# Patient Record
Sex: FEMALE | Race: WHITE | NOT HISPANIC OR LATINO | URBAN - METROPOLITAN AREA
[De-identification: names, ages, dates, MRNs, and addresses within clinical notes are randomized per-mention and may not be internally consistent; named-entity substitution may affect disease eponyms.]

---

## 2019-06-18 ENCOUNTER — OFFICE VISIT (OUTPATIENT)
Dept: GYNECOLOGY | Facility: CLINIC | Age: 29
End: 2019-06-18
Payer: COMMERCIAL

## 2019-06-18 VITALS
SYSTOLIC BLOOD PRESSURE: 110 MMHG | WEIGHT: 238 LBS | BODY MASS INDEX: 35.25 KG/M2 | DIASTOLIC BLOOD PRESSURE: 80 MMHG | HEIGHT: 69 IN

## 2019-06-18 DIAGNOSIS — Z01.419 ENCOUNTER FOR ROUTINE GYNECOLOGICAL EXAMINATION WITH PAPANICOLAOU SMEAR OF CERVIX: ICD-10-CM

## 2019-06-18 DIAGNOSIS — Z30.09 GENERAL COUNSELING AND ADVICE ON CONTRACEPTIVE MANAGEMENT: Primary | ICD-10-CM

## 2019-06-18 PROCEDURE — S0610 ANNUAL GYNECOLOGICAL EXAMINA: HCPCS | Performed by: OBSTETRICS & GYNECOLOGY

## 2019-06-18 PROCEDURE — G0145 SCR C/V CYTO,THINLAYER,RESCR: HCPCS | Performed by: OBSTETRICS & GYNECOLOGY

## 2019-06-18 RX ORDER — ETONOGESTREL AND ETHINYL ESTRADIOL VAGINAL RING .015; .12 MG/D; MG/D
RING VAGINAL
Qty: 1 EACH | Refills: 12 | Status: SHIPPED | OUTPATIENT
Start: 2019-06-18 | End: 2021-09-26 | Stop reason: HOSPADM

## 2019-06-18 NOTE — PROGRESS NOTES
Chief Complaint:  Annual    HPI:  6/18/2019      Marcela Cho is a 28 y.o. female who presents for annual exam. The patient has no complaints today. The patient is sexually active. GYN screening history: last pap: was normal. The patient is not taking hormone replacement therapy.  The patient participates in regular exercise: yes.  The patient reports that there is not domestic violence in her life.    History of abnormal Pap smear: no  Family history of uterine or ovarian cancer: no  History of abnormal mammogram: no  Family history of breast cancer: no    OB History: Menstrual History:  OB History     No data available         Patient's last menstrual period was 06/01/2019 (exact date).         Medical History: History reviewed. No pertinent past medical history.    Surgical History:   Past Surgical History:   Procedure Laterality Date   • CHOLECYSTECTOMY     • TONSILLECTOMY         Social History:   Social History     Social History   • Marital status: Single     Spouse name: N/A   • Number of children: N/A   • Years of education: N/A     Social History Main Topics   • Smoking status: Former Smoker   • Smokeless tobacco: Never Used   • Alcohol use Yes   • Drug use: Yes     Types: Marijuana   • Sexual activity: Yes     Partners: Male     Other Topics Concern   • None     Social History Narrative   • None       Family History:   Family History   Problem Relation Age of Onset   • Family history unknown: Yes       Current Medications:   Current Outpatient Prescriptions   Medication Sig Dispense Refill   • etonogestrel-ethinyl estradiol (NUVARING) 0.12-0.015 mg/24 hr vaginal ring Insert vaginally and leave in for 3 consecutive weeks, then remove for 1 week. 1 each 12     No current facility-administered medications for this visit.        Allergies: Patient has no known allergies.    Review of Systems  Constitutional: negative  Gastrointestinal: negative  Genitourinary:negative  Reproductive:normal menses, no abnormal  "bleeding, pelvic pain or discharge, no breast pain or new or enlarging lumps on self exam  Integument/breast: negative  Musculoskeletal:negative  Neurological: negative  Behavioral/Psych: negative  Endocrine: negative    Physical Exam  /80 (BP Location: Right upper arm, Patient Position: Sitting)   Ht 1.753 m (5' 9\")   Wt 108 kg (238 lb)   LMP 06/01/2019 (Exact Date)   BMI 35.15 kg/m²      General Appearance: Alert, cooperative, no acute distress  Head: Normocephalic, without obvious abnormality, atraumatic    Neck: no adenopathy  Thyroid: no enlargement/tenderness/nodules  ,   Breast: no masses, nontender and no discharge  Abdomen: Soft, nontender, nondistended,   no masses, no organomegaly  Back: kyphosisNo   Pelvic:normal, Bartholin's, Urethra, Martinez's normal, female escutcheon  normal mucosa, normal discharge  anteverted  normal size, anteverted  normal adnexa and no mass, fullness, tenderness  Rectal:   Extremities:  Musculoskeletal:  Skin: Skin color, texture, turgor normal, no rashes or lesions  Lymph nodes: inguinal and axillary nodes normal  Neurologic:oriented and  memory intact  Psych:normal affect and behavior appropriate    Assessment  Presents today for her routine annual exam and also to discuss contraceptive methods.  The NuvaRing.  She is also interested in a so that she could time her cycle for her up and coming honeymoon in August.  She previously has used the NuvaRing with no problem    Plan    The results of Pap NuvaRing ordered after discussion with the patient.    Return in about 1 year (around 6/18/2020).  Kin Shafer MD        "

## 2019-06-21 LAB
CASE RPRT: NORMAL
CLINICAL INFO: NORMAL
CLINICAL INFO: NORMAL
LMP START DATE: NORMAL
SPECIMEN PROCESSING COMMENT: NORMAL
THIN PREP CVX: NORMAL

## 2021-02-05 LAB
HBV SURFACE AG SER QL: NONREACTIVE
HIV 1+2 AB+HIV1 P24 AG SERPL QL IA: NONREACTIVE
QST CHLAMYDIA TRACHOMATIS RNA, TMA: NEGATIVE
QST NEISSERIA GONORRHOEAE RNA, TMA: NEGATIVE
RUBELLA IGG SCREEN: NORMAL
T PALLIDUM AB SER QL IF: NONREACTIVE

## 2021-03-08 ENCOUNTER — TRANSCRIBE ORDERS (OUTPATIENT)
Dept: SCHEDULING | Age: 31
End: 2021-03-08

## 2021-03-08 DIAGNOSIS — Z36.82 ENCOUNTER FOR ANTENATAL SCREENING FOR NUCHAL TRANSLUCENCY: Primary | ICD-10-CM

## 2021-03-17 ENCOUNTER — HOSPITAL ENCOUNTER (OUTPATIENT)
Dept: PERINATAL CARE | Facility: HOSPITAL | Age: 31
Discharge: HOME | End: 2021-03-17
Attending: OBSTETRICS & GYNECOLOGY
Payer: COMMERCIAL

## 2021-03-17 VITALS — BODY MASS INDEX: 34.8 KG/M2 | WEIGHT: 235 LBS | HEIGHT: 69 IN

## 2021-03-17 DIAGNOSIS — Z36.3 ANTENATAL SCREENING FOR MALFORMATION USING ULTRASONICS: ICD-10-CM

## 2021-03-17 DIAGNOSIS — Z3A.13 13 WEEKS GESTATION OF PREGNANCY: ICD-10-CM

## 2021-03-17 DIAGNOSIS — Z36.82 ENCOUNTER FOR ANTENATAL SCREENING FOR NUCHAL TRANSLUCENCY: ICD-10-CM

## 2021-03-17 PROCEDURE — 76801 OB US < 14 WKS SINGLE FETUS: CPT

## 2021-04-05 ENCOUNTER — TRANSCRIBE ORDERS (OUTPATIENT)
Dept: SCHEDULING | Age: 31
End: 2021-04-05

## 2021-04-05 DIAGNOSIS — Z36.3 ENCOUNTER FOR ANTENATAL SCREENING FOR MALFORMATIONS: Primary | ICD-10-CM

## 2021-05-04 ENCOUNTER — HOSPITAL ENCOUNTER (OUTPATIENT)
Dept: PERINATAL CARE | Facility: HOSPITAL | Age: 31
Discharge: HOME | End: 2021-05-04
Attending: OBSTETRICS & GYNECOLOGY
Payer: COMMERCIAL

## 2021-05-04 DIAGNOSIS — O35.9XX0 SUSPECTED FETAL ANOMALY, ANTEPARTUM, SINGLE OR UNSPECIFIED FETUS: ICD-10-CM

## 2021-05-04 DIAGNOSIS — Z36.3 ENCOUNTER FOR ANTENATAL SCREENING FOR MALFORMATION: ICD-10-CM

## 2021-05-04 DIAGNOSIS — O99.212 OBESITY AFFECTING PREGNANCY IN SECOND TRIMESTER: ICD-10-CM

## 2021-05-04 DIAGNOSIS — Z36.3 ENCOUNTER FOR ANTENATAL SCREENING FOR MALFORMATIONS: ICD-10-CM

## 2021-05-04 DIAGNOSIS — Z3A.20 20 WEEKS GESTATION OF PREGNANCY: ICD-10-CM

## 2021-05-04 DIAGNOSIS — Z36.86 ENCOUNTER FOR ANTENATAL SCREENING FOR CERVICAL LENGTH: ICD-10-CM

## 2021-05-04 PROCEDURE — 76811 OB US DETAILED SNGL FETUS: CPT

## 2021-05-08 ENCOUNTER — IMMUNIZATION (OUTPATIENT)
Dept: IMMUNIZATION | Facility: CLINIC | Age: 31
End: 2021-05-08

## 2021-06-03 ENCOUNTER — IMMUNIZATION (OUTPATIENT)
Dept: IMMUNIZATION | Facility: CLINIC | Age: 31
End: 2021-06-03

## 2021-08-27 LAB — GP B STREP SPEC QL CULT: NO GROWTH

## 2021-09-17 ENCOUNTER — APPOINTMENT (RX ONLY)
Dept: URBAN - METROPOLITAN AREA CLINIC 23 | Facility: CLINIC | Age: 31
Setting detail: DERMATOLOGY
End: 2021-09-17

## 2021-09-17 DIAGNOSIS — D22 MELANOCYTIC NEVI: ICD-10-CM

## 2021-09-17 DIAGNOSIS — L82.1 OTHER SEBORRHEIC KERATOSIS: ICD-10-CM

## 2021-09-17 DIAGNOSIS — Z87.2 PERSONAL HISTORY OF DISEASES OF THE SKIN AND SUBCUTANEOUS TISSUE: ICD-10-CM

## 2021-09-17 DIAGNOSIS — L81.4 OTHER MELANIN HYPERPIGMENTATION: ICD-10-CM

## 2021-09-17 PROBLEM — D22.5 MELANOCYTIC NEVI OF TRUNK: Status: ACTIVE | Noted: 2021-09-17

## 2021-09-17 PROBLEM — D48.5 NEOPLASM OF UNCERTAIN BEHAVIOR OF SKIN: Status: ACTIVE | Noted: 2021-09-17

## 2021-09-17 PROCEDURE — ? ADDITIONAL NOTES

## 2021-09-17 PROCEDURE — ? COUNSELING

## 2021-09-17 PROCEDURE — 99203 OFFICE O/P NEW LOW 30 MIN: CPT | Mod: 25

## 2021-09-17 PROCEDURE — 11102 TANGNTL BX SKIN SINGLE LES: CPT

## 2021-09-17 PROCEDURE — ? BIOPSY BY SHAVE METHOD

## 2021-09-17 ASSESSMENT — LOCATION DETAILED DESCRIPTION DERM
LOCATION DETAILED: LEFT INFERIOR MEDIAL UPPER BACK
LOCATION DETAILED: RIGHT MEDIAL SUPERIOR CHEST
LOCATION DETAILED: LEFT SUPERIOR MEDIAL UPPER BACK

## 2021-09-17 ASSESSMENT — LOCATION ZONE DERM: LOCATION ZONE: TRUNK

## 2021-09-17 ASSESSMENT — LOCATION SIMPLE DESCRIPTION DERM
LOCATION SIMPLE: LEFT UPPER BACK
LOCATION SIMPLE: CHEST

## 2021-09-23 ENCOUNTER — HOSPITAL ENCOUNTER (INPATIENT)
Facility: HOSPITAL | Age: 31
LOS: 2 days | Discharge: HOME | End: 2021-09-26
Attending: OBSTETRICS & GYNECOLOGY | Admitting: OBSTETRICS & GYNECOLOGY
Payer: COMMERCIAL

## 2021-09-23 RX ORDER — CETIRIZINE HYDROCHLORIDE 10 MG/1
10 TABLET ORAL DAILY
COMMUNITY

## 2021-09-23 RX ORDER — ASPIRIN 81 MG/1
81 TABLET ORAL DAILY
COMMUNITY
End: 2021-09-26 | Stop reason: HOSPADM

## 2021-09-23 ASSESSMENT — PATIENT HEALTH QUESTIONNAIRE - PHQ9: SUM OF ALL RESPONSES TO PHQ9 QUESTIONS 1 & 2: 0

## 2021-09-24 ENCOUNTER — ANESTHESIA (INPATIENT)
Dept: OBSTETRICS AND GYNECOLOGY | Facility: HOSPITAL | Age: 31
End: 2021-09-24
Payer: COMMERCIAL

## 2021-09-24 ENCOUNTER — ANESTHESIA EVENT (INPATIENT)
Dept: OBSTETRICS AND GYNECOLOGY | Facility: HOSPITAL | Age: 31
End: 2021-09-24
Payer: COMMERCIAL

## 2021-09-24 PROBLEM — Z34.90 TERM PREGNANCY: Status: ACTIVE | Noted: 2021-09-24

## 2021-09-24 PROBLEM — O13.3 GESTATIONAL HYPERTENSION, THIRD TRIMESTER: Status: ACTIVE | Noted: 2021-09-24

## 2021-09-24 LAB
ABO + RH BLD: NORMAL
ABO + RH BLD: NORMAL
ALBUMIN SERPL-MCNC: 2.7 G/DL (ref 3.4–5)
ALP SERPL-CCNC: 185 IU/L (ref 35–126)
ALT SERPL-CCNC: 14 IU/L (ref 11–54)
AMPHET UR QL SCN: NOT DETECTED
ANION GAP SERPL CALC-SCNC: 12 MEQ/L (ref 3–15)
AST SERPL-CCNC: 19 IU/L (ref 15–41)
BARBITURATES UR QL SCN: NOT DETECTED
BENZODIAZ UR QL SCN: NOT DETECTED
BILIRUB SERPL-MCNC: 0.1 MG/DL (ref 0.3–1.2)
BLD GP AB SCN SERPL QL: NEGATIVE
BUN SERPL-MCNC: 9 MG/DL (ref 8–20)
BUPRENORPHINE UR QL: NOT DETECTED
CALCIUM SERPL-MCNC: 9.7 MG/DL (ref 8.9–10.3)
CANNABINOIDS UR QL SCN: NOT DETECTED
CHLORIDE SERPL-SCNC: 100 MEQ/L (ref 98–109)
CO2 SERPL-SCNC: 24 MEQ/L (ref 22–32)
COCAINE UR QL SCN: NOT DETECTED
CREAT SERPL-MCNC: 0.6 MG/DL (ref 0.6–1.1)
CREAT UR-MCNC: 56.9 MG/DL
D AG BLD QL: POSITIVE
D AG BLD QL: POSITIVE
ERYTHROCYTE [DISTWIDTH] IN BLOOD BY AUTOMATED COUNT: 13 % (ref 11.7–14.4)
GFR SERPL CREATININE-BSD FRML MDRD: >60 ML/MIN/1.73M*2
GLUCOSE SERPL-MCNC: 84 MG/DL (ref 70–99)
HCT VFR BLDCO AUTO: 38.1 % (ref 35–45)
HGB BLD-MCNC: 12.7 G/DL (ref 11.8–15.7)
LABORATORY COMMENT REPORT: NORMAL
LABORATORY COMMENT REPORT: NORMAL
MCH RBC QN AUTO: 28.7 PG (ref 28–33.2)
MCHC RBC AUTO-ENTMCNC: 33.3 G/DL (ref 32.2–35.5)
MCV RBC AUTO: 86 FL (ref 83–98)
METHADONE UR QL SCN: NOT DETECTED
OPIATES UR QL SCN: NOT DETECTED
OXYCODONE UR QL SCN: NOT DETECTED
PCP UR QL SCN: NOT DETECTED
PDW BLD AUTO: 10 FL (ref 9.4–12.3)
PLATELET # BLD AUTO: 324 K/UL (ref 150–369)
POTASSIUM SERPL-SCNC: 3.9 MEQ/L (ref 3.6–5.1)
PROT SERPL-MCNC: 6.9 G/DL (ref 6–8.2)
PROT UR-MCNC: 7 MG/DL
PROT/CREAT UR: 0.12 MG/G CREAT
RBC # BLD AUTO: 4.43 M/UL (ref 3.93–5.22)
SARS-COV-2 RNA RESP QL NAA+PROBE: NEGATIVE
SODIUM SERPL-SCNC: 136 MEQ/L (ref 136–144)
SPECIMEN EXP DATE BLD: NORMAL
T PALLIDUM AB SER QL IF: NONREACTIVE
WBC # BLD AUTO: 12.45 K/UL (ref 3.8–10.5)

## 2021-09-24 PROCEDURE — 85027 COMPLETE CBC AUTOMATED: CPT | Performed by: OBSTETRICS & GYNECOLOGY

## 2021-09-24 PROCEDURE — U0003 INFECTIOUS AGENT DETECTION BY NUCLEIC ACID (DNA OR RNA); SEVERE ACUTE RESPIRATORY SYNDROME CORONAVIRUS 2 (SARS-COV-2) (CORONAVIRUS DISEASE [COVID-19]), AMPLIFIED PROBE TECHNIQUE, MAKING USE OF HIGH THROUGHPUT TECHNOLOGIES AS DESCRIBED BY CMS-2020-01-R: HCPCS | Performed by: OBSTETRICS & GYNECOLOGY

## 2021-09-24 PROCEDURE — 86780 TREPONEMA PALLIDUM: CPT | Performed by: OBSTETRICS & GYNECOLOGY

## 2021-09-24 PROCEDURE — 80307 DRUG TEST PRSMV CHEM ANLYZR: CPT | Performed by: OBSTETRICS & GYNECOLOGY

## 2021-09-24 PROCEDURE — 86850 RBC ANTIBODY SCREEN: CPT

## 2021-09-24 PROCEDURE — 80053 COMPREHEN METABOLIC PANEL: CPT | Performed by: OBSTETRICS & GYNECOLOGY

## 2021-09-24 PROCEDURE — 72000011 HC VAGINAL DELIVERY LEVEL 1

## 2021-09-24 PROCEDURE — 36415 COLL VENOUS BLD VENIPUNCTURE: CPT | Performed by: OBSTETRICS & GYNECOLOGY

## 2021-09-24 PROCEDURE — 0KQM0ZZ REPAIR PERINEUM MUSCLE, OPEN APPROACH: ICD-10-PCS | Performed by: OBSTETRICS & GYNECOLOGY

## 2021-09-24 PROCEDURE — 63600000 HC DRUGS/DETAIL CODE: Performed by: OBSTETRICS & GYNECOLOGY

## 2021-09-24 PROCEDURE — 82570 ASSAY OF URINE CREATININE: CPT | Performed by: OBSTETRICS & GYNECOLOGY

## 2021-09-24 PROCEDURE — 25000000 HC PHARMACY GENERAL: Performed by: OBSTETRICS & GYNECOLOGY

## 2021-09-24 PROCEDURE — 12000000 HC ROOM AND CARE MED/SURG

## 2021-09-24 PROCEDURE — 63700000 HC SELF-ADMINISTRABLE DRUG: Performed by: OBSTETRICS & GYNECOLOGY

## 2021-09-24 PROCEDURE — 0UQMXZZ REPAIR VULVA, EXTERNAL APPROACH: ICD-10-PCS | Performed by: OBSTETRICS & GYNECOLOGY

## 2021-09-24 RX ORDER — ALUMINUM HYDROXIDE, MAGNESIUM HYDROXIDE, AND SIMETHICONE 1200; 120; 1200 MG/30ML; MG/30ML; MG/30ML
30 SUSPENSION ORAL EVERY 4 HOURS PRN
Status: DISCONTINUED | OUTPATIENT
Start: 2021-09-24 | End: 2021-09-26 | Stop reason: HOSPADM

## 2021-09-24 RX ORDER — CALCIUM CARBONATE 200(500)MG
500 TABLET,CHEWABLE ORAL EVERY 4 HOURS PRN
Status: DISCONTINUED | OUTPATIENT
Start: 2021-09-24 | End: 2021-09-26 | Stop reason: HOSPADM

## 2021-09-24 RX ORDER — CARBOPROST TROMETHAMINE 250 UG/ML
250 INJECTION, SOLUTION INTRAMUSCULAR ONCE AS NEEDED
Status: CANCELLED | OUTPATIENT
Start: 2021-09-24

## 2021-09-24 RX ORDER — LIDOCAINE HYDROCHLORIDE 10 MG/ML
0-30 INJECTION, SOLUTION EPIDURAL; INFILTRATION; INTRACAUDAL; PERINEURAL ONCE AS NEEDED
Status: COMPLETED | OUTPATIENT
Start: 2021-09-24 | End: 2021-09-24

## 2021-09-24 RX ORDER — IBUPROFEN 600 MG/1
600 TABLET ORAL EVERY 6 HOURS PRN
Status: DISCONTINUED | OUTPATIENT
Start: 2021-09-24 | End: 2021-09-26 | Stop reason: HOSPADM

## 2021-09-24 RX ORDER — DIBUCAINE 1 %
1 OINTMENT (GRAM) TOPICAL AS NEEDED
Status: DISCONTINUED | OUTPATIENT
Start: 2021-09-24 | End: 2021-09-26 | Stop reason: HOSPADM

## 2021-09-24 RX ORDER — AMOXICILLIN 250 MG
1 CAPSULE ORAL 2 TIMES DAILY
Status: DISCONTINUED | OUTPATIENT
Start: 2021-09-24 | End: 2021-09-26 | Stop reason: HOSPADM

## 2021-09-24 RX ORDER — METHYLERGONOVINE MALEATE 0.2 MG/ML
200 INJECTION INTRAVENOUS ONCE AS NEEDED
Status: CANCELLED | OUTPATIENT
Start: 2021-09-24

## 2021-09-24 RX ORDER — OXYTOCIN 10 [USP'U]/ML
10 INJECTION, SOLUTION INTRAMUSCULAR; INTRAVENOUS ONCE AS NEEDED
Status: CANCELLED | OUTPATIENT
Start: 2021-09-24

## 2021-09-24 RX ORDER — ACETAMINOPHEN 325 MG/1
650 TABLET ORAL EVERY 4 HOURS PRN
Status: DISCONTINUED | OUTPATIENT
Start: 2021-09-24 | End: 2021-09-26 | Stop reason: HOSPADM

## 2021-09-24 RX ORDER — OXYTOCIN/0.9 % SODIUM CHLORIDE 40/1000ML
500 PLASTIC BAG, INJECTION (ML) INTRAVENOUS ONCE
Status: COMPLETED | OUTPATIENT
Start: 2021-09-24 | End: 2021-09-24

## 2021-09-24 RX ORDER — TRANEXAMIC ACID 10 MG/ML
1000 INJECTION, SOLUTION INTRAVENOUS ONCE AS NEEDED
Status: CANCELLED | OUTPATIENT
Start: 2021-09-24

## 2021-09-24 RX ORDER — OXYTOCIN/0.9 % SODIUM CHLORIDE 40/1000ML
PLASTIC BAG, INJECTION (ML) INTRAVENOUS CONTINUOUS
Status: DISCONTINUED | OUTPATIENT
Start: 2021-09-24 | End: 2021-09-24

## 2021-09-24 RX ORDER — SODIUM CHLORIDE, SODIUM LACTATE, POTASSIUM CHLORIDE, CALCIUM CHLORIDE 600; 310; 30; 20 MG/100ML; MG/100ML; MG/100ML; MG/100ML
125 INJECTION, SOLUTION INTRAVENOUS CONTINUOUS
Status: DISCONTINUED | OUTPATIENT
Start: 2021-09-24 | End: 2021-09-24

## 2021-09-24 RX ORDER — MISOPROSTOL 200 UG/1
1000 TABLET ORAL ONCE AS NEEDED
Status: CANCELLED | OUTPATIENT
Start: 2021-09-24

## 2021-09-24 RX ORDER — BUTORPHANOL TARTRATE 1 MG/ML
2 INJECTION INTRAMUSCULAR; INTRAVENOUS ONCE
Status: COMPLETED | OUTPATIENT
Start: 2021-09-24 | End: 2021-09-24

## 2021-09-24 RX ORDER — OXYTOCIN/0.9 % SODIUM CHLORIDE 40/1000ML
PLASTIC BAG, INJECTION (ML) INTRAVENOUS
Status: DISPENSED
Start: 2021-09-24 | End: 2021-09-24

## 2021-09-24 RX ADMIN — LIDOCAINE HYDROCHLORIDE 30 ML: 10 INJECTION, SOLUTION EPIDURAL; INFILTRATION; INTRACAUDAL; PERINEURAL at 03:55

## 2021-09-24 RX ADMIN — BUTORPHANOL TARTRATE 2 MG: 1 INJECTION, SOLUTION INTRAMUSCULAR; INTRAVENOUS at 02:05

## 2021-09-24 RX ADMIN — DOCUSATE SODIUM AND SENNOSIDES 1 TABLET: 8.6; 5 TABLET, FILM COATED ORAL at 20:01

## 2021-09-24 RX ADMIN — Medication 20 UNITS: at 03:55

## 2021-09-24 RX ADMIN — IBUPROFEN 600 MG: 600 TABLET, FILM COATED ORAL at 23:19

## 2021-09-24 RX ADMIN — IBUPROFEN 600 MG: 600 TABLET, FILM COATED ORAL at 14:30

## 2021-09-24 RX ADMIN — DOCUSATE SODIUM AND SENNOSIDES 1 TABLET: 8.6; 5 TABLET, FILM COATED ORAL at 08:33

## 2021-09-24 RX ADMIN — ACETAMINOPHEN 650 MG: 325 TABLET, FILM COATED ORAL at 12:40

## 2021-09-24 RX ADMIN — SODIUM CHLORIDE, POTASSIUM CHLORIDE, SODIUM LACTATE AND CALCIUM CHLORIDE 1000 ML: 600; 310; 30; 20 INJECTION, SOLUTION INTRAVENOUS at 02:09

## 2021-09-24 RX ADMIN — IBUPROFEN 600 MG: 600 TABLET, FILM COATED ORAL at 08:34

## 2021-09-24 RX ADMIN — ACETAMINOPHEN 650 MG: 325 TABLET, FILM COATED ORAL at 08:33

## 2021-09-24 RX ADMIN — PRENATAL VIT W/ FE FUMARATE-FA TAB 27-0.8 MG 1 TABLET: 27-0.8 TAB at 08:33

## 2021-09-24 RX ADMIN — ACETAMINOPHEN 650 MG: 325 TABLET, FILM COATED ORAL at 16:20

## 2021-09-24 NOTE — L&D DELIVERY NOTE
OB Vaginal Delivery Note    Delivery:2021 at 3:52 AM   Patient:Marcela Canseco  :1990    Review the Delivery Report for details.     Delivery Details    Pre-Op Diagnosis: 1. 30 y.o.  intrauterine pregnancy at 40w4d with araujo gestation.  2. Active labor  3. Maternal obesity  4. Gestational hypertension   Post-Op Diagnosis: 1. Same, s/p delivery of viable female infant   Delivery Clinician: Kim Hwang    Delivery Assist;Delivery Nurse;Nursery Nurse  ;Katerina Dennison;Valerie Mendiola    Delivery Type: Vaginal, Spontaneous    Labor Complications:     EBL: 200  mL   Anesthesia Type: None    Placenta Delivery Spontaneous    Placenta Disposition: discarded    Additional Specimens None      INFANT INFORMATION  Time of Birth:3:52 AM   Presentation: Vertex   Position:Left ,Occiput ,Anterior   Cord: 3 vessels ,Complications:None   [place] Sex: female   [place] Weight:       1 Minute 5 Minute 10 Minute   Apgar Totals: 9   9          Information for the patient's :  Mariana Canseco [[id number]]      Cord Gas     None           Delivery Details:    Marcela aCnseco is a 30 y.o.  at 40w4d gestation who presented to the hospital for Term pregnancy [Z34.90].  Her labor was spontaneous.  The patient progressed to fully dilated and pushed for  hours and   minutes.  A viable  female  infant was delivered by Vaginal, Spontaneous  from Left ,Occiput ,Anterior .  The anterior and posterior shoulders delivered without difficulty followed by the remainder of the infant. A spontaneous cry was heard, and the infant appeared to be moving all 4 extremities. The cord was clamped and cut with 3 vessels  noted.  The placenta delivered spontaneously shortly thereafter.  Fundal massage was performed and the fundus was found to be firm, and lochia was within normal limits. The perineum, vagina, cervix were inspected, and the following lacerations were noted:      Episiotomy: None    Lacerations:   Perineal: 2nd   Repaired: Yes     Periurethral: bilateral   Repaired: Yes    Labial:   Repaired:     Sulcus:   Repaired:     Vaginal:   Repaired:     Cervical:    Repaired:        Any lacerations were repaired in the usual fashion using 3-0 Synthetic Suture  suture. Excellent hemostasis was noted. At the completion of the case, sponge and needle counts were correct. The infant and patient were left in the delivery room in stable condition.     Attending Attestation: I was present and scrubbed for the entire procedure.    Kim Bauman MD

## 2021-09-24 NOTE — PLAN OF CARE
Lactation visit. Breastfeeding book given. Pt assisted with cc position.     Problem: Breastfeeding  Goal: Effective Breastfeeding  Outcome: Progressing

## 2021-09-24 NOTE — CONSULTS
SW received consult to meet with pt this afternoon. Consult placed d/t positive drug screen for marijuana back in February of 2021. ROZINA met with pt, , and baby this afternoon. ROZNIA introduced self and role to pt and family. Pt reported she lives with her  in a 3SH with a few PHILLY. This is pt's first child. 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 They have heat and electric in the home. They have all appropriate baby supplies already such as diapers, wipes, clothing, food, and car seat. They have been given many supplies from family members with children as well. They do not have a current pediatrician but have chosen [facility] in [place] as their office. They were told to call once baby is ready to go home and will be set up with a pediatrician. Pt's  has insurance paperwork at home and will fill this out soon and send to his insurance for baby to be placed on his health insurance, [employer] PPO/HMO. They use [facility] in   [address] on [address] in [place].     SW was consulted to discussed marijuana use in early pregnancy. Pt felt comfortable discussing subject in front of her . Pt expressed she was using medical marijuana to help with everyday anxiety and after the loss of a young close family member in December. Pt stated once she found out she was pregnant with Sarai she immediately stopped using her medical marijuana. A UDS was done in the hospital and pt was negative. Pt does not follow with any outpatient therapist/psychiatrist. Pt felt she did not need this at the time but is aware of post-partum depression and will reach out for help if needed. Pt has no other elicit drug or alcohol use. Pt and  both reported a great deal of support from both of their families. SW informed pt and  that d/t her previous positive UDS in February at her OB/GYN that a [employer] report would be made. Pt and  aware. SW made referral  via [employer] website, awaiting receipt of referral. Per RN, pediatrician does not want to do a meconium/drug test on baby. SW to follow if additional information is needed or resources are requested.

## 2021-09-24 NOTE — PROGRESS NOTES
Labor and Delivery Progress Note    Subjective     Patient very uncomfortable     Objective     Vital Signs for the last 24 hours:  Temp:  [36.8 °C (98.2 °F)] 36.8 °C (98.2 °F)  Heart Rate:  [63-77] 75  Resp:  [20] 20  BP: (117-150)/(68-84) 121/70     Fetal Monitoring:  FHR Baseline: 130  FHR Variability: moderate  FHR Accelerations: present  FHR Decelerations: absent    Contraction Frequency: q5    IUPC: no    Latest cervical exam:  Cervical Dilation (cm): 6  Cervical Effacement: 100     Method: sterile exam per RN (21 0309)           Current Facility-Administered Medications:   •  [COMPLETED] lactated ringer's bolus 1,000 mL, 1,000 mL, intravenous, Once, Last Rate: 4,000 mL/hr at 21 020, 1,000 mL at 21 020 **FOLLOWED BY** lactated ringer's infusion, 125 mL/hr, intravenous, Continuous, Kim Bauman MD    Assessment/Plan     Marecla Canseco is a 30 y.o. female  at 40w4d admitted with labor management     1) FHR: Category I  2) GBS:  negative  3) Epidural now    Kim Bauman MD

## 2021-09-24 NOTE — ANESTHESIOLOGIST PRE-PROCEDURE ATTESTATION
Pre-Procedure Patient Identification:  I am the Primary Anesthesiologist and have identified the patient on 09/24/21 at 3:17 AM.   I have confirmed the procedure(s) will be performed by the following surgeon/proceduralist * No surgeons listed *.

## 2021-09-24 NOTE — ANESTHESIA PREPROCEDURE EVALUATION
Anesthesia ROS/MED HX    Anesthesia History - neg      Relevant Problems   No relevant active problems       Physical Exam    Airway   Mallampati: I   TM distance: <3 FB   Neck ROM: full  Cardiovascular - normal   Rhythm: regular   Rate: normalPulmonary - normal   clear to auscultation  Other Findings   Back - neg   landmarks identified        CBC Results       09/24/21     0048    WBC 12.45    RBC 4.43    HGB 12.7    HCT 38.1    MCV 86.0    MCH 28.7    MCHC 33.3                Anesthesia Plan    Plan: labor epidural   ASA 2  Anesthetic plan and risks discussed with: patient

## 2021-09-24 NOTE — PROGRESS NOTES
"POST PARTUM NOTE    PPD#0    S:  Patient seen and examined.  The patient has no complaints at this time.  Pain is well controlled with Motrin.  Tolerated general diet.  Denies n/v/d.  +ambulation.  Lochia decreasing.  Infant sleepy and not very interested in feeding.      O:    Visit Vitals  /72 (BP Location: Right upper arm)   Pulse 74   Temp 36.8 °C (98.2 °F) (Oral)   Resp 16   Ht 1.753 m (5' 9\")   Wt 113 kg (250 lb)   LMP 2020   SpO2 100%   Breastfeeding Yes   BMI 36.92 kg/m²     Physical Exam:  AAOx3, NAD  CV:RR  L: No respiratory distress  Abd: soft, appropriately tender to palpation, fundus firm below umbilicus  Ex: No edema, non-tender, no cyanosis    Lab Results   Component Value Date    WBC 12.45 (H) 2021    HGB 12.7 2021    HCT 38.1 2021    MCV 86.0 2021     2021           A/P: 30 y.o.  s/p   PPD#0    FEN: GD,HLIV  HEME: Stable Hg  PAIN: Motrin PRN  Gi: Senokot prn  Gu: voiding  PPX: ambulation encouraged  DISPO: Continue routine post partum care     GHTN: met criteria for gHTN in labor; labs ordered for tomorrow; BP now normal postpartum; will continue to monitor    Della Gonzalez MD  Obstetrics and Gynecology  [facility]  Office phone # [phone]  In hospital pager # [phone]        "

## 2021-09-24 NOTE — NURSING NOTE
Pt arrives complaining of ctx every 5 minutes. Pt states 7/10 pain. Pt states positive fetal movement and denies vaginal bleeding, leakage. Pt placed on monitor.

## 2021-09-24 NOTE — H&P
EUGENE Canseco is a 30 y.o. female  at 40w4d with an estimated due date of 2021, by Last Menstrual Period who presents with contractions. Patient reports contractions q5 mins throughout the day. Denies any LOF, vaginal bleeding, reports good fetal movement.    BP elevated upon presentation. Denies any HA, vision changes, CP, SOB, RUQ/epigastric pain. Pregnancy complicated by Class II Obesity, +MJ use in early pregnancy.    Last PO intake:   ,     ,      OB History:   OB History    Para Term  AB Living   1 0 0 0 0 0   SAB IAB Ectopic Multiple Live Births   0 0 0 0 0      # Outcome Date GA Lbr Kurtis/2nd Weight Sex Delivery Anes PTL Lv   1 Current                Medical History: History reviewed. No pertinent past medical history.    Surgical History:   Past Surgical History:   Procedure Laterality Date   • CHOLECYSTECTOMY     • TONSILLECTOMY     • WISDOM TOOTH EXTRACTION         Social History:   Social History     Socioeconomic History   • Marital status:      Spouse name: Ruslan   • Number of children: None   • Years of education: None   • Highest education level: None   Occupational History   • None   Tobacco Use   • Smoking status: Former Smoker   • Smokeless tobacco: Never Used   Vaping Use   • Vaping Use: Never used   Substance and Sexual Activity   • Alcohol use: Not Currently   • Drug use: Not Currently     Types: Marijuana     Comment: pt states not while pregnant   • Sexual activity: Yes     Partners: Male   Other Topics Concern   • None   Social History Narrative   • None     Social Determinants of Health     Financial Resource Strain:    • Difficulty of Paying Living Expenses: Not on file   Food Insecurity:    • Worried About Running Out of Food in the Last Year: Not on file   • Ran Out of Food in the Last Year: Not on file   Transportation Needs:    • Lack of Transportation (Medical): Not on file   • Lack of Transportation (Non-Medical): Not on file   Physical Activity:     • Days of Exercise per Week: Not on file   • Minutes of Exercise per Session: Not on file   Stress:    • Feeling of Stress : Not on file   Social Connections:    • Frequency of Communication with Friends and Family: Not on file   • Frequency of Social Gatherings with Friends and Family: Not on file   • Attends Orthodox Services: Not on file   • Active Member of Clubs or Organizations: Not on file   • Attends Club or Organization Meetings: Not on file   • Marital Status: Not on file   Intimate Partner Violence:    • Fear of Current or Ex-Partner: Not on file   • Emotionally Abused: Not on file   • Physically Abused: Not on file   • Sexually Abused: Not on file   Housing Stability:    • Unable to Pay for Housing in the Last Year: Not on file   • Number of Places Lived in the Last Year: Not on file   • Unstable Housing in the Last Year: Not on file        Family History:   Family History   Problem Relation Age of Onset   • Cancer Maternal Grandfather        Allergies: Patient has no known allergies.    Prior to Admission medications    Medication Sig Start Date End Date Taking? Authorizing Provider   aspirin 81 mg enteric coated tablet Take 81 mg by mouth daily.   Yes Juan Alberto Cartagena MD   cetirizine (ZyrTEC) 10 mg tablet Take 10 mg by mouth daily.   Yes ProviderJuan Alberto MD   prenatal vit no.130-iron-folic 27 mg iron- 800 mcg tablet tablet Take 1 tablet by mouth daily.   Yes ProviderJuan Alberto MD   etonogestrel-ethinyl estradiol (NUVARING) 0.12-0.015 mg/24 hr vaginal ring Insert vaginally and leave in for 3 consecutive weeks, then remove for 1 week. 6/18/19 6/17/20  Kin Shafer Sr., MD       Review of Systems  Pertinent items are noted in HPI.    Objective     Vital Signs for the last 24 hours:  Temp:  [36.8 °C (98.2 °F)] 36.8 °C (98.2 °F)  Heart Rate:  [63-77] 77  Resp:  [20] 20  BP: (117-144)/(68-84) 122/68    Latest cervical exam:  Cervical Dilation (cm): 1-2        Method: sterile exam per RN  (21 0103)      Fetal Monitoring:  FHR Baseline: 145  FHR Variability: mod  FHR Accelerations: pos  FHR Decelerations: neg    Contraction Frequency: q2-5    Exam:  General Appearance: Alert, cooperative, no acute distress  Lungs: Clear to auscultation bilaterally, respirations unlabored  Heart: Regular rate and rhythm, S1 and S2 normal, no murmur, rub or gallop  Abdomen: gravid, nontender  Genitalia: See vaginal exam  Extremities: no edema or calf tenderness  Neurologic: grossly intact without focal deficits    Ultrasounds:   I have reviewed the applicable Ultrasounds.     Labs:  Rubella IgG Scr   Date Value Ref Range Status   2021 IMMUNE  Final     Strep Gp B Cult/DNA Probe   Date Value Ref Range Status   2021 No growth See table below, No growth at 18-24 hours, Probable contaminants, suggest recollection, No growth at 48 hours, No growth at 72 hours, No growth at 96 hours, No growth at 120 hours, No growth at 1 week, No growth at 2 weeks, No growth at 3 weeks, No gr... Final       Assessment/Plan     Marcela Canseco is a 30 y.o. female  at 40w4d admitted for early labor management and elevated BP at term.    FHR: Category I  GBS: negative   UDS, covid swab.   Patient with elevated BP- will gather labs and monitor for signs/symptoms of preeclampsia     Kim Bauman MD

## 2021-09-24 NOTE — NURSING NOTE
1000 Pt OOB to bathroom. Pt denied dizziness upon ambulation. Voided 1000 ml. Bleeding moderate and wnl. Pads changed. Pt back to bed.  Will continue to monitor.

## 2021-09-25 LAB
ALBUMIN SERPL-MCNC: 2.2 G/DL (ref 3.4–5)
ALP SERPL-CCNC: 141 IU/L (ref 35–126)
ALT SERPL-CCNC: 17 IU/L (ref 11–54)
ANION GAP SERPL CALC-SCNC: 9 MEQ/L (ref 3–15)
AST SERPL-CCNC: 27 IU/L (ref 15–41)
BILIRUB SERPL-MCNC: 0.2 MG/DL (ref 0.3–1.2)
BUN SERPL-MCNC: 9 MG/DL (ref 8–20)
CALCIUM SERPL-MCNC: 8.2 MG/DL (ref 8.9–10.3)
CHLORIDE SERPL-SCNC: 101 MEQ/L (ref 98–109)
CO2 SERPL-SCNC: 24 MEQ/L (ref 22–32)
CREAT SERPL-MCNC: 0.6 MG/DL (ref 0.6–1.1)
ERYTHROCYTE [DISTWIDTH] IN BLOOD BY AUTOMATED COUNT: 13.5 % (ref 11.7–14.4)
GFR SERPL CREATININE-BSD FRML MDRD: >60 ML/MIN/1.73M*2
GLUCOSE SERPL-MCNC: 73 MG/DL (ref 70–99)
HCT VFR BLDCO AUTO: 33.5 % (ref 35–45)
HGB BLD-MCNC: 10.7 G/DL (ref 11.8–15.7)
MCH RBC QN AUTO: 28.2 PG (ref 28–33.2)
MCHC RBC AUTO-ENTMCNC: 31.9 G/DL (ref 32.2–35.5)
MCV RBC AUTO: 88.2 FL (ref 83–98)
PDW BLD AUTO: 10.2 FL (ref 9.4–12.3)
PLATELET # BLD AUTO: 284 K/UL (ref 150–369)
POTASSIUM SERPL-SCNC: 4.2 MEQ/L (ref 3.6–5.1)
PROT SERPL-MCNC: 5.6 G/DL (ref 6–8.2)
RBC # BLD AUTO: 3.8 M/UL (ref 3.93–5.22)
SODIUM SERPL-SCNC: 134 MEQ/L (ref 136–144)
WBC # BLD AUTO: 12.57 K/UL (ref 3.8–10.5)

## 2021-09-25 PROCEDURE — 82247 BILIRUBIN TOTAL: CPT | Performed by: OBSTETRICS & GYNECOLOGY

## 2021-09-25 PROCEDURE — 63700000 HC SELF-ADMINISTRABLE DRUG: Performed by: OBSTETRICS & GYNECOLOGY

## 2021-09-25 PROCEDURE — 85027 COMPLETE CBC AUTOMATED: CPT | Performed by: OBSTETRICS & GYNECOLOGY

## 2021-09-25 PROCEDURE — 12000000 HC ROOM AND CARE MED/SURG

## 2021-09-25 PROCEDURE — 80053 COMPREHEN METABOLIC PANEL: CPT | Performed by: OBSTETRICS & GYNECOLOGY

## 2021-09-25 PROCEDURE — 36415 COLL VENOUS BLD VENIPUNCTURE: CPT | Performed by: OBSTETRICS & GYNECOLOGY

## 2021-09-25 RX ORDER — IBUPROFEN 600 MG/1
600 TABLET ORAL EVERY 6 HOURS PRN
Qty: 30 TABLET | Refills: 0 | Status: SHIPPED | OUTPATIENT
Start: 2021-09-25 | End: 2023-05-31 | Stop reason: HOSPADM

## 2021-09-25 RX ADMIN — PRENATAL VIT W/ FE FUMARATE-FA TAB 27-0.8 MG 1 TABLET: 27-0.8 TAB at 09:01

## 2021-09-25 RX ADMIN — IBUPROFEN 600 MG: 600 TABLET, FILM COATED ORAL at 09:01

## 2021-09-25 RX ADMIN — DOCUSATE SODIUM AND SENNOSIDES 1 TABLET: 8.6; 5 TABLET, FILM COATED ORAL at 21:32

## 2021-09-25 RX ADMIN — IBUPROFEN 600 MG: 600 TABLET, FILM COATED ORAL at 21:31

## 2021-09-25 RX ADMIN — DOCUSATE SODIUM AND SENNOSIDES 1 TABLET: 8.6; 5 TABLET, FILM COATED ORAL at 09:01

## 2021-09-25 NOTE — PROGRESS NOTES
"POST PARTUM NOTE    PPD#1    S:  Patient seen and examined.  The patient has no complaints at this time.  Pain is well controlled with Motrin.  Tolerated general diet.  Denies n/v/d/f/c. Lochia decreasing.  Breastfeeding infant well.      O:    Visit Vitals  /70 (BP Location: Right upper arm, Patient Position: Lying)   Pulse 76   Temp 36.7 °C (98.1 °F) (Oral)   Resp 16   Ht 1.753 m (5' 9\")   Wt 113 kg (250 lb)   LMP 2020   SpO2 100%   Breastfeeding Yes   BMI 36.92 kg/m²     Physical Exam:  AAOx3, NAD  Abd: soft, appropriately tender to palpation  Fundus: firm below umbilicus and nontender  Ex: non-tender, no cyanosis    A/P: 30 y.o.  s/p   PPD# 1    DISPO:Continue general postpartum care   Discharge home tomorrow with scripts and instructions    DO LUCIANO De La Cruz (dist.)  Beeper [id number]  Cell  [phone]  2021  1:37 PM  "

## 2021-09-25 NOTE — DISCHARGE SUMMARY
Inpatient Discharge Summary    BRIEF OVERVIEW  Admitting Provider: Kim Bauman MD  Discharge Provider: Bal Rodgers DO  Primary Care Physician at Discharge: Pt States, No Pcp [phone]    Admission Date: 9/23/2021     Discharge Date: 9/26/2021    Primary Discharge Diagnosis  Term pregnancy    Secondary Discharge Diagnosis  Gestational hypertension    Discharge Disposition  Home     Discharge Medications     Medication List      START taking these medications    ibuprofen 600 mg tablet  Commonly known as: MOTRIN  Take 1 tablet (600 mg total) by mouth every 6 (six) hours as needed (pain) for up to 10 days.  Dose: 600 mg        CONTINUE taking these medications    cetirizine 10 mg tablet  Commonly known as: ZyrTEC  Take 10 mg by mouth daily.  Dose: 10 mg     prenatal vit no.130-iron-folic 27 mg iron- 800 mcg tablet tablet  Take 1 tablet by mouth daily.  Dose: 1 tablet        STOP taking these medications    aspirin 81 mg enteric coated tablet     etonogestreL-ethinyl estradioL 0.12-0.015 mg/24 hr vaginal ring  Commonly known as: NUVARING            Outpatient Follow-Up  Encounter Information    Schedule 6 week pp visit         Test Results Pending at Discharge      DETAILS OF HOSPITAL STAY    Presenting Problem/History of Present Illness  Term pregnancy [Z34.90]    Hospital Course/Operative Procedures Performed  DO LUCIANO De La Cruz (dist.)  Beeper [id number]  Cell  [phone]  9/25/2021  1:45 PM

## 2021-09-26 VITALS
TEMPERATURE: 97.5 F | RESPIRATION RATE: 16 BRPM | DIASTOLIC BLOOD PRESSURE: 78 MMHG | HEIGHT: 69 IN | BODY MASS INDEX: 37.03 KG/M2 | WEIGHT: 250 LBS | HEART RATE: 71 BPM | SYSTOLIC BLOOD PRESSURE: 135 MMHG | OXYGEN SATURATION: 100 %

## 2021-09-26 PROCEDURE — 63700000 HC SELF-ADMINISTRABLE DRUG: Performed by: OBSTETRICS & GYNECOLOGY

## 2021-09-26 RX ADMIN — DOCUSATE SODIUM AND SENNOSIDES 1 TABLET: 8.6; 5 TABLET, FILM COATED ORAL at 08:33

## 2021-09-26 RX ADMIN — IBUPROFEN 600 MG: 600 TABLET, FILM COATED ORAL at 08:32

## 2021-09-26 RX ADMIN — PRENATAL VIT W/ FE FUMARATE-FA TAB 27-0.8 MG 1 TABLET: 27-0.8 TAB at 08:33

## 2021-09-26 NOTE — PLAN OF CARE
BF progressing  Problem: Breastfeeding  Goal: Effective Breastfeeding  Outcome: Progressing  Intervention: Promote Effective Breastfeeding  Flowsheets (Taken 9/26/2021 0857)  Breastfeeding Assistance:   feeding cue recognition promoted   feeding on demand promoted   nipple shield utilized  Parent/Child Attachment Promotion:   face-to-face positioning promoted   cue recognition promoted   strengths emphasized  Intervention: Support Exclusive Breastfeeding Success  Flowsheets (Taken 9/26/2021 0857)  Supportive Measures: positive reinforcement provided  Breastfeeding Support: lactation counseling provided

## 2021-09-26 NOTE — LACTATION NOTE
PT reports BF is progressing with a nipple shield. Baby was latched in the FB hold upon LC arrival, sustained active suckling present. Good colostrum in shield following release.     Reviewed all teaching and expectations over the coming days. Parents aware to closely monitor output and weight loss/gain. Recommended early follow up with outpatient LC.

## 2021-09-26 NOTE — PLAN OF CARE
Problem: Adult Inpatient Plan of Care  Goal: Plan of Care Review  Outcome: Met  Flowsheets (Taken 9/26/2021 1214)  Progress: improving  Plan of Care Reviewed With:   patient   spouse  Outcome Summary: discharge instructions given, questions answered   Plan of Care Review  Plan of Care Reviewed With: patient, spouse  Progress: improving  Outcome Summary: discharge instructions given, questions answered

## 2022-05-26 ENCOUNTER — OFFICE VISIT (OUTPATIENT)
Dept: FAMILY MEDICINE | Facility: CLINIC | Age: 32
End: 2022-05-26
Payer: COMMERCIAL

## 2022-05-26 VITALS
OXYGEN SATURATION: 98 % | HEIGHT: 69 IN | WEIGHT: 230.2 LBS | HEART RATE: 68 BPM | TEMPERATURE: 97.9 F | DIASTOLIC BLOOD PRESSURE: 80 MMHG | SYSTOLIC BLOOD PRESSURE: 114 MMHG | BODY MASS INDEX: 34.1 KG/M2

## 2022-05-26 DIAGNOSIS — M54.50 CHRONIC BILATERAL LOW BACK PAIN WITHOUT SCIATICA: ICD-10-CM

## 2022-05-26 DIAGNOSIS — Z13.220 SCREENING CHOLESTEROL LEVEL: ICD-10-CM

## 2022-05-26 DIAGNOSIS — Z00.00 ENCOUNTER FOR GENERAL ADULT MEDICAL EXAMINATION WITHOUT ABNORMAL FINDINGS: Primary | ICD-10-CM

## 2022-05-26 DIAGNOSIS — G89.29 CHRONIC BILATERAL LOW BACK PAIN WITHOUT SCIATICA: ICD-10-CM

## 2022-05-26 PROCEDURE — 3008F BODY MASS INDEX DOCD: CPT | Performed by: FAMILY MEDICINE

## 2022-05-26 PROCEDURE — 99385 PREV VISIT NEW AGE 18-39: CPT | Performed by: FAMILY MEDICINE

## 2022-05-26 PROCEDURE — 36415 COLL VENOUS BLD VENIPUNCTURE: CPT | Performed by: FAMILY MEDICINE

## 2022-05-26 PROCEDURE — 3074F SYST BP LT 130 MM HG: CPT | Performed by: FAMILY MEDICINE

## 2022-05-26 PROCEDURE — 3079F DIAST BP 80-89 MM HG: CPT | Performed by: FAMILY MEDICINE

## 2022-05-26 RX ORDER — ETONOGESTREL AND ETHINYL ESTRADIOL VAGINAL RING .015; .12 MG/D; MG/D
RING VAGINAL
Status: ON HOLD | COMMUNITY
Start: 2017-01-26 | End: 2023-05-29

## 2022-05-26 RX ORDER — TIZANIDINE 2 MG/1
2 TABLET ORAL EVERY 8 HOURS PRN
Qty: 90 TABLET | Refills: 1 | Status: SHIPPED | OUTPATIENT
Start: 2022-05-26 | End: 2022-08-02

## 2022-05-26 RX ORDER — MELOXICAM 15 MG/1
15 TABLET ORAL DAILY
Qty: 30 TABLET | Refills: 1 | Status: SHIPPED | OUTPATIENT
Start: 2022-05-26 | End: 2022-08-02

## 2022-05-26 NOTE — PROGRESS NOTES
"HPI:  Marcela Canseco is an 31 y.o. female presenting for new patient visit/annual well visit.    Chronic intermittent low back pain - 12 years duration. Currently pain is bilateral worse on left over the past 3 weeks. Pain radiates down both legs. No paresthesia.    Has been told piriformis syndrome on right.    Allergies:  Patient has no known allergies.    History reviewed. No pertinent past medical history.    Past Surgical History:   Procedure Laterality Date   • CHOLECYSTECTOMY     • PILONIDAL CYST / SINUS EXCISION N/A    • TONSILLECTOMY     • WISDOM TOOTH EXTRACTION         Social History     Tobacco Use   • Smoking status: Never Smoker   • Smokeless tobacco: Never Used   Substance Use Topics   • Alcohol use: Yes     Comment: 3 glasses of wine a week       Current Outpatient Medications on File Prior to Visit   Medication Sig Dispense Refill   • cetirizine (ZyrTEC) 10 mg tablet Take 10 mg by mouth daily.     • etonogestreL-ethinyl estradioL (NUVARING) 0.12-0.015 mg/24 hr vaginal ring insert 1 vaginal ring by vaginal route  every month leave in place for 3 weeks, remove for 1 week     • ibuprofen (MOTRIN) 600 mg tablet Take 1 tablet (600 mg total) by mouth every 6 (six) hours as needed (pain) for up to 10 days. 30 tablet 0     No current facility-administered medications on file prior to visit.       Family History   Problem Relation Age of Onset   • No Known Problems Biological Mother    • No Known Problems Biological Father    • Psoriasis Biological Brother    • No Known Problems Biological Brother    • No Known Problems Biological Brother    • No Known Problems Biological Brother    • Cancer Maternal Grandfather        Visit Vitals  /80   Pulse 68   Temp 36.6 °C (97.9 °F) (Oral)   Ht 1.753 m (5' 9\")   Wt 104 kg (230 lb 3.2 oz)   SpO2 98%   BMI 33.99 kg/m²        Physical Exam  Constitutional:       General: She is not in acute distress.     Appearance: Normal appearance. She is not ill-appearing.   HENT: "      Right Ear: Tympanic membrane and ear canal normal.      Left Ear: Tympanic membrane and ear canal normal.      Nose: Nose normal.      Mouth/Throat:      Pharynx: Oropharynx is clear.   Eyes:      Extraocular Movements: Extraocular movements intact.      Pupils: Pupils are equal, round, and reactive to light.   Neck:      Thyroid: No thyromegaly.   Cardiovascular:      Rate and Rhythm: Normal rate and regular rhythm.      Pulses: Normal pulses.           Dorsalis pedis pulses are 2+ on the right side and 2+ on the left side.      Heart sounds: Normal heart sounds. No murmur heard.  Pulmonary:      Effort: Pulmonary effort is normal. No respiratory distress.      Breath sounds: Normal breath sounds. No wheezing, rhonchi or rales.   Abdominal:      General: Bowel sounds are normal.      Palpations: Abdomen is soft. There is no mass.      Tenderness: There is no abdominal tenderness. There is no guarding or rebound.   Musculoskeletal:         General: No swelling or tenderness. Normal range of motion.      Cervical back: Normal range of motion and neck supple.      Right lower leg: No edema.      Left lower leg: No edema.      Comments: Negative SLR, strength 5/5 BLE   Lymphadenopathy:      Cervical: No cervical adenopathy.   Skin:     Findings: No lesion or rash.   Neurological:      General: No focal deficit present.      Mental Status: She is alert.      Deep Tendon Reflexes: Reflexes normal.   Psychiatric:         Mood and Affect: Mood normal.           A/P  1. Encounter for general adult medical examination without abnormal findings    - CBC and Differential  - Comprehensive metabolic panel    2. Chronic bilateral low back pain without sciatica  Record release signed for xray report from chiropractor  - Ambulatory referral to Physical Therapy; Future  - tiZANidine (ZANAFLEX) 2 mg tablet; Take 1 tablet (2 mg total) by mouth every 8 (eight) hours as needed for muscle spasms.  Dispense: 90 tablet; Refill: 1  -  meloxicam (MOBIC) 15 mg tablet; Take 1 tablet (15 mg total) by mouth daily.  Dispense: 30 tablet; Refill: 1    3. Screening cholesterol level    - Lipid panel          Next appointment recommended:  TBD      The patient (parent) indicates an understanding of the events of today's medical evaluation and agrees to the plan of care.    I have asked the patient (parent) to be on the alert for new or worsening symptoms and to call the office directly or proceed to the nearest ER if such should occur.

## 2022-05-27 LAB
ALBUMIN SERPL-MCNC: 4.1 G/DL (ref 3.8–4.8)
ALBUMIN/GLOB SERPL: 1.5 {RATIO} (ref 1.2–2.2)
ALP SERPL-CCNC: 75 IU/L (ref 44–121)
ALT SERPL-CCNC: 15 IU/L (ref 0–32)
AST SERPL-CCNC: 10 IU/L (ref 0–40)
BASOPHILS # BLD AUTO: 0.1 X10E3/UL (ref 0–0.2)
BASOPHILS NFR BLD AUTO: 1 %
BILIRUB SERPL-MCNC: 0.3 MG/DL (ref 0–1.2)
BUN SERPL-MCNC: 11 MG/DL (ref 6–20)
BUN/CREAT SERPL: 14 (ref 9–23)
CALCIUM SERPL-MCNC: 9.1 MG/DL (ref 8.7–10.2)
CHLORIDE SERPL-SCNC: 103 MMOL/L (ref 96–106)
CHOLEST SERPL-MCNC: 190 MG/DL (ref 100–199)
CO2 SERPL-SCNC: 24 MMOL/L (ref 20–29)
CREAT SERPL-MCNC: 0.77 MG/DL (ref 0.57–1)
EGFRCR SERPLBLD CKD-EPI 2021: 104 ML/MIN/1.73
EOSINOPHIL # BLD AUTO: 0.4 X10E3/UL (ref 0–0.4)
EOSINOPHIL NFR BLD AUTO: 6 %
ERYTHROCYTE [DISTWIDTH] IN BLOOD BY AUTOMATED COUNT: 12.5 % (ref 11.7–15.4)
GLOBULIN SER CALC-MCNC: 2.7 G/DL (ref 1.5–4.5)
GLUCOSE SERPL-MCNC: 91 MG/DL (ref 65–99)
HCT VFR BLD AUTO: 42.8 % (ref 34–46.6)
HDLC SERPL-MCNC: 68 MG/DL
HGB BLD-MCNC: 14.3 G/DL (ref 11.1–15.9)
IMM GRANULOCYTES # BLD AUTO: 0 X10E3/UL (ref 0–0.1)
IMM GRANULOCYTES NFR BLD AUTO: 0 %
LDLC SERPL CALC-MCNC: 99 MG/DL (ref 0–99)
LYMPHOCYTES # BLD AUTO: 2.4 X10E3/UL (ref 0.7–3.1)
LYMPHOCYTES NFR BLD AUTO: 38 %
MCH RBC QN AUTO: 28.1 PG (ref 26.6–33)
MCHC RBC AUTO-ENTMCNC: 33.4 G/DL (ref 31.5–35.7)
MCV RBC AUTO: 84 FL (ref 79–97)
MONOCYTES # BLD AUTO: 0.6 X10E3/UL (ref 0.1–0.9)
MONOCYTES NFR BLD AUTO: 9 %
NEUTROPHILS # BLD AUTO: 2.9 X10E3/UL (ref 1.4–7)
NEUTROPHILS NFR BLD AUTO: 46 %
PLATELET # BLD AUTO: 305 X10E3/UL (ref 150–450)
POTASSIUM SERPL-SCNC: 4.8 MMOL/L (ref 3.5–5.2)
PROT SERPL-MCNC: 6.8 G/DL (ref 6–8.5)
RBC # BLD AUTO: 5.08 X10E6/UL (ref 3.77–5.28)
SODIUM SERPL-SCNC: 139 MMOL/L (ref 134–144)
TRIGL SERPL-MCNC: 133 MG/DL (ref 0–149)
VLDLC SERPL CALC-MCNC: 23 MG/DL (ref 5–40)
WBC # BLD AUTO: 6.4 X10E3/UL (ref 3.4–10.8)

## 2022-06-23 ENCOUNTER — HOSPITAL ENCOUNTER (OUTPATIENT)
Dept: PHYSICAL THERAPY | Facility: HOSPITAL | Age: 32
Setting detail: THERAPIES SERIES
Discharge: HOME | End: 2022-06-23
Attending: FAMILY MEDICINE
Payer: COMMERCIAL

## 2022-06-23 DIAGNOSIS — M54.50 CHRONIC BILATERAL LOW BACK PAIN WITHOUT SCIATICA: ICD-10-CM

## 2022-06-23 DIAGNOSIS — G89.29 CHRONIC BILATERAL LOW BACK PAIN WITHOUT SCIATICA: ICD-10-CM

## 2022-06-23 PROCEDURE — 97162 PT EVAL MOD COMPLEX 30 MIN: CPT | Mod: GP

## 2022-06-23 PROCEDURE — 97530 THERAPEUTIC ACTIVITIES: CPT | Mod: GP

## 2022-06-23 NOTE — OP PT TREATMENT LOG
IE 6/23/22   30 Day 7/23/22   60 Day 8/23/22   90 Day    Precautions N/A; Did not respond to repeated flex or ext on IE   Insurance Auth    Treatment  Current Session Time   Modalities Total Time for Session Not performed   Heat/Ice  CPT 63596 CP/MHP PRN    E. Stimulation Manual  CPT 85712 Trial TENS to lumbar paraspinals for pain modulation   E. Stim Unattended  CPT 79352    Ultrasound  CPT 56288    Manual   CPT 61911 Total Time for Session Not performed   STM/MFR/TPR STM to lumbar paraspinals    Instrument Assisted STM     Mobilizations Gr I/II CPA mobilizations to L1-L5   ROM/Flexibility    Myofascial Decompression    Ther. Exercise  CPT 75441                   Group Total Time for Session Not performed     Sets Reps Load Comment    Upright bike     When pt able to tolerate           TAC        TAC w/ hip add        TAC w/ hip ABD        Bridge w/ hip add        Clamshells         L/R lumbar rotation        Hamstring stretch        Piriformis stretch                                                                       Neuro Re-Ed  CPT 58229   Group Total Time for Session Not performed     Sets Reps Load Comment                                     Gait  CPT 02295 Group Total Time for Session Not performed                  Ther. Activity  CPT 92285 Group Total Time for Session 8-22 Minutes   Pt education  Pt educated on PT role, POC, initial examination findings, symptom management, initial HEP        Group  CPT 21897 Total Time for Session Not performed

## 2022-06-23 NOTE — PROGRESS NOTES
Referring Provider: By co-signing this Plan of Care (POC) either electronically or physically you agree to the following:    I have reviewed the the Plan of Care established by the therapist within this document and certify that the services are skilled and medically necessary. I have reviewed the plan and recommend that these services continue to meet the goals stated in this document.       EXTERNAL PROVIDER FAXING BACK:    PHYSICIAN SIGNATURE: __________________________________     DATE: ___________________  TIME: _____________    IMPORTANT:  If returning this Plan of Care by fax, please fax back ONLY the signature page.   _________________________________________________________________________      [facility] Fax: [phone]        PT EVALUATION FOR OUTPATIENT THERAPY    Patient: Marcela Canseco  MRN: [id number]  : 1990 31 y.o.   Referring Physician: Della Gallagher DO  Date of Visit: 2022      Certification Dates:  22 through 22         Recommended Frequency & Duration:  1 time/week for up to 8 weeks     Diagnosis:   1. Chronic bilateral low back pain without sciatica        Chief Complaints:   Chief Complaint   Patient presents with   • Difficulty Walking   • Dec ROM   • Dec Strength   • Abnormality Of Gait   • Pain   • Decreased recreational/play activity   • Decreased Mobility   •  Decreased Community Integration   • Decreased Endurance       Precautions: no known precautions/restrictions    Past Medical History: History reviewed. No pertinent past medical history.    Past Surgical History:   Past Surgical History:   Procedure Laterality Date   • CHOLECYSTECTOMY     • PILONIDAL CYST / SINUS EXCISION N/A    • TONSILLECTOMY     • WISDOM TOOTH EXTRACTION           LEARNING ASSESSMENT    Assessment completed:  Yes    Learner name:  Marcela Canseco      Learner: Patient    Learning Barriers:  Learning barriers: No Barriers    Preferred Language: English      Needed: No    Education Provided:   Method: Discussion, Handout, Demonstration and Video  Readiness: acceptance  Response: Demonstrated understanding and Verbalizes understanding      CO-LEARNER ASSESSMENT:    Completed: No            OBJECTIVE MEASUREMENTS/DATA:    Time In Session:  Start Time: 1400  Stop Time: 1500  Time Calculation (min): 60 min   Assessment and Plan - 06/23/22 1417        Assessment    Plan of Care reviewed and patient/family in agreement Yes     System Pathology/Pathophysiology Noted musculoskeletal     Functional Limitations in Following Categories (PT Eval) self-care;home management;work;community/leisure     Rehab Potential/Prognosis good, to achieve stated therapy goals     Problem List decreased endurance;decreased flexibility;decreased ROM;decreased strength;impaired motor control;impaired postural control;impaired sensation;pain     Clinical Assessment Marcela is a 30 yo female who presents to OPPT with chief complaint of acute flare up of low back pain with recent onset of BLE radicular symptoms. Initial examination findings reveal impaired motor control of transverse abdominus, decreased lumbar AROM, impaired BLE strength (RLE<LLE), impaired BLE sensation, (+) BLE slump test, (+) tenderness to palpation of lumbar paraspinals and spinous processes, and pt report of overall decrease in function due to pain. Patient will benefit from skilled PT 1-2x per week for 8 weeks to address above deficits and progress toward stated goals.     Plan and Recommendations Follow POC as noted in tx log. Focus on TA strengthening/activation. Trial repeated movements again to see if possible direction preference would be beneficial.     Planned Services CPT 78311 Gait training;CPT 07471 Manual therapy;CPT 57037 Neuromuscular Reeducation;CPT 23866 Therapeutic activities;CPT 68703 Therapeutic exercises;CPT 11544 Electrical stimulation UNATTENDED;CPT 86912 Hot/Cold Packs therapy                General  Information - 06/23/22 5432        Session Details    Document Type initial evaluation     Mode of Treatment physical therapy     Patient/Family/Caregiver Comments/Observations Patient arrives amb IND w/o AD. She reports chief complaint of low back pain with recent onset of BLE radicular pain.     OP Specialty Orthopedics        General Information    Referring Physician Dr. Gallagher     History of present illness/functional impairment Marcela is 30 yo female who reports to OPPT with chief complaint of low back pain. She reports long history of on/off low back pain for 12 years. She states she had a baby last September (vaginal delivery) and during pregnancy she had piriformis syndrome which she was treated for at her chiropractor. She states she had an x-ray of lumbar spine done in December which showed degenerative disc disease. She states about 6 weeks ago she had onset of intense low back pain about a week after she ran [employer]. She states her pain has started to radiate to B hips, lower abdomen, thighs, and calves. Patient denies BLE N/T. She reports she saw chiropractor weekly during pregnancy and just started going again yesterday. She states chiropractor gave her adjustment yesterday which she feels relieved some of her pain. Patient states her pain is worst in the morning. She states her pain is aggravated by lifting, bending over to lift her baby, sitting for prolonged periods of time, and driving. Pt reports her pain is alleviated by happy baby yoga pose, heat/ice, stretching, and laying down. She states her current pain is 7/10, pain at worst 10/10 and pain at best 5/10. She states her overall goal for PT is to get rid of her low back pain.     Existing Precautions/Restrictions no known precautions/restrictions                Pain/Vitals - 06/23/22 2807        Pain Assessment    Currently in pain Yes     Preferred Pain Scale number (Numeric Rating Pain Scale)     Pain Side/Orientation  lower;bilateral     Pain: Body location Back;Leg     Pain Rating (0-10): Pre Activity 7     Pain Rating (0-10): Activity 7     Pain Rating (0-10): Post Activity 7        Pre Activity Vital Signs    Pulse 86     SpO2 97 %     Oxygen Therapy None (Room air)     /84     BP Location Left upper arm     BP Method Automatic     Patient Position Sitting        Pain Intervention    Intervention  IE, pain monitored     Post Intervention Comments IE, pain monitored                Falls - 06/23/22 1417        Initial Falls Assessment    One or more falls in the last year Yes   pt reports slipping in her socks walking down the stairs about 4 weeks ago    How many times 1     Was the patient injured in any fall No     Fall prevention interventions recommended Educate and re-educate the patient on safety strategies;Apply non-skid footwear at bedtime;Visually observe patient as determined by the plan of care     Recommended plan to address falls PT to address                PT - 06/23/22 1417        Physical Therapy    Physical Therapy Ortho        PT Plan    Frequency of treatment 1 time/week     PT Duration 8 weeks     PT Cert From 06/23/22     PT Cert To 08/18/22     Date PT POC was sent to provider 06/23/22     Signed PT Plan of Care received?  No                   Sensory Tests    Sensory Testing - 06/23/22 1417        Sensory Assessment (Somatosensory)    Sensory Assessment (Somatosensory) bilateral LE     Bilateral LE Sensory Assessment light touch awareness;impaired   pt reports RLE decr'd to LT compared to LLE              ROM    Range of Motion - 06/23/22 1400        RIGHT: Lower Extremity AROM Assessment    Right LE AROM normal WFL        LEFT: Lower Extremity AROM Assessment    Left LE AROM normal WFL        Lumbar AROM    Lumbar Flexion --   <25%, (+) pain    Lumbar Extension --   <25%, no pain    Lumbar Left SB --   59 cm from middle finger to floor    Lumbar Right SB --   59 cm from middle finger to floor     Lumbar Left Rotation --   66 deg    Lumbar Right Rotation --   68 deg    Comments RFIS: pt unable to complete 10 repetitions d/t incr'd pain; ANUPAM: x10 incr'd LBP from 3/10 to 4/10               MMT    Manual Muscle Tests - 06/23/22 1417        RIGHT: Lower Extremity Manual Muscle Test Assessment    Hip Flexion gross movement (3+/5) fair plus     Hip Extension gross movement (3+/5) fair plus     Hip Abduction gross movement (4-/5) good minus     Hip Adduction gross movement (4-/5) good minus     Hip Internal Rotation gross movement (4-/5) good minus     Hip External Rotation gross movement (4-/5) good minus     Knee Flexion strength (4-/5) good minus     Knee Extension strength (4-/5) good minus   (+) pain    Ankle Dorsiflexion gross movement (4-/5) good minus     Ankle Plantarflexion gross movement (4-/5) good minus        LEFT: Lower Extremity Manual Muscle Test Assessment    Hip Flexion gross movement (4-/5) good minus     Hip Extension gross movement (4-/5) good minus     Hip Abduction gross movement (4-/5) good minus     Hip Adduction gross movement (4-/5) good minus     Hip Internal Rotation gross movement (4-/5) good minus     Hip External Rotation gross movement (4-/5) good minus     Knee Flexion strength (4-/5) good minus     Knee Extension strength (4-/5) good minus     Ankle Dorsiflexion gross movement (4-/5) good minus     Ankle Plantarflexion gross movement (4-/5) good minus               Palpation    Palpation - 06/23/22 1417        Palpation    Back Palpation  (+) TTP to bilateral lumbar paraspinals, (+) TTP to L3, L4, L5 spinous processes               Ortho Special Tests    Orthopedic Special Tests - 06/23/22 1400        Neurodynamic     Slump Test Right - Positive;Left - Positive               Gait and Mobility    Gait and Mobility - 06/23/22 1417        Gait Training    Jones, Gait independent     Variable surfaces Flat surface     Assistive Device none     Comment (Gait/Stairs) Pt  ambulates IND w/o AD demonstrating intermittent antalgic gait with forward flexed posture.               Balance/Posture    Balance and Posture - 06/23/22 1417        Postural Deviations    Postural Deviations head and neck;shoulder;low back     Head and Neck forward head     Shoulder right shoulder forward;left shoulder forward     Low Back flattened        Posture    Posture postural deviations               Outcome Measures    PT Outcome Measures - 06/23/22 1417        Subjective Outcome Measures    Oswestry 30/50 (60%)        Patient Specific Functional Scale    PSFS ACTIVITY 1 Walk Dog     PSFS ANSWER 1 1     PSFS ACTIVITY 2  baby     PSFS ANSWER 2 3     PSFS ACTIVITY 3 Driving     PSFS ANSWER 3 5     PSFS Sum of Activity Scores 9     PSFS Number of Activities 3     PSFS Percent Score 30 %                  Goals     •  LBP goals 2022       Short Term Goals Time Frame Result Comment/Progress   Patient will report < 4/10 pain at worst on avg in order to improve functional performance.   4 Weeks     Patient will improve Oswestry score by 7 points in order to demonstrate self-perceived functional performance.   4 Weeks     Patient will improve lumbar AROM by 25% or 10 deg in all directions.   4 Weeks     Patient will be IND in HEP.   4 Weeks     Patient will demonstrate ability to perform TAC IND w/o tactile or verbal cueing to demonstrate proper muscle recruitment/motor control.  4 Weeks     Patient will demonstrate (-) BLE slump test 4 Weeks     Patient will improve PSFS outcome measure scores by 2 points in each category in order to demonstrate self-perceived functional improvement.    4 Weeks       Long Term Goals Time Frame Result Comment/Progress   Patient will report 0/10 pain at worst on avg in order to improve functional performance.   8 Weeks     Patient will improve Oswestry score by 14 points in order to demonstrate self-perceived functional performance.   8 Weeks     Patient will improve lumbar  AROM by 50% or 20 deg in all directions.   8 Weeks     Patient will be IND with HEP upon discharge for carryover of functional gains.   8 Weeks     Patient will improve PSFS outcome measure scores by 4 points in each category in order to demonstrate self-perceived functional improvement.  8 Weeks        8 Weeks              •  Mutually agreed upon pain goal       Mutually agreed upon pain goal: 1/10        •  Patient stated LBP (pt-stated)       Patient reports her overall goal for PT is to get rid of her low back pain.                   TREATMENT PLAN:    IE 6/23/22   30 Day 7/23/22   60 Day 8/23/22   90 Day    Precautions N/A; Did not respond to repeated flex or ext on IE   Insurance Auth    Treatment  Current Session Time   Modalities Total Time for Session Not performed   Heat/Ice  CPT 60904 CP/MHP PRN    E. Stimulation Manual  CPT 56576 Trial TENS to lumbar paraspinals for pain modulation   E. Stim Unattended  CPT 36378    Ultrasound  CPT 87341    Manual   CPT 46667 Total Time for Session Not performed   STM/MFR/TPR STM to lumbar paraspinals    Instrument Assisted STM     Mobilizations Gr I/II CPA mobilizations to L1-L5   ROM/Flexibility    Myofascial Decompression    Ther. Exercise  CPT 62437                   Group Total Time for Session Not performed     Sets Reps Load Comment    Upright bike     When pt able to tolerate           TAC        TAC w/ hip add        TAC w/ hip ABD        Bridge w/ hip add        Clamshells         L/R lumbar rotation        Hamstring stretch        Piriformis stretch                                                                       Neuro Re-Ed  CPT 78631   Group Total Time for Session Not performed     Sets Reps Load Comment                                     Gait  CPT 54151 Group Total Time for Session Not performed                  Ther. Activity  CPT 83434 Group Total Time for Session 8-22 Minutes   Pt education  Pt educated on PT role, POC, initial examination  findings, symptom management, initial HEP        Group  CPT 62228 Total Time for Session Not performed                 ASSESSMENT:    This 31 y.o. year old female presents to PT with above stated diagnosis. Physical Therapy evaluation reveals decreased endurance, decreased flexibility, decreased ROM, decreased strength, impaired motor control, impaired postural control, impaired sensation, pain resulting in self-care, home management, work, community/leisure limitations. Marcela Canseco will benefit from skilled PT services to address limitation, work towards rehab and patient goals and maximize PLOF of chosen ADLs.     Planned Services: The patient's treatment will include CPT 07459 Gait training, CPT 41506 Manual therapy, CPT 47962 Neuromuscular Reeducation, CPT 78646 Therapeutic activities, CPT 15238 Therapeutic exercises, CPT 86854 Electrical stimulation UNATTENDED, CPT 08971 Hot/Cold Packs therapy, .

## 2022-06-30 ENCOUNTER — HOSPITAL ENCOUNTER (OUTPATIENT)
Dept: PHYSICAL THERAPY | Facility: HOSPITAL | Age: 32
Setting detail: THERAPIES SERIES
Discharge: HOME | End: 2022-06-30
Attending: FAMILY MEDICINE
Payer: COMMERCIAL

## 2022-06-30 DIAGNOSIS — M54.50 CHRONIC BILATERAL LOW BACK PAIN WITHOUT SCIATICA: Primary | ICD-10-CM

## 2022-06-30 DIAGNOSIS — G89.29 CHRONIC BILATERAL LOW BACK PAIN WITHOUT SCIATICA: Primary | ICD-10-CM

## 2022-06-30 PROCEDURE — 97110 THERAPEUTIC EXERCISES: CPT | Mod: GP

## 2022-06-30 PROCEDURE — 97140 MANUAL THERAPY 1/> REGIONS: CPT | Mod: GP

## 2022-06-30 NOTE — OP PT TREATMENT LOG
IE 6/23/22   30 Day 7/23/22   60 Day 8/23/22   90 Day    Precautions N/A; Did not respond to repeated flex or ext on IE   Insurance Auth    Treatment  Current Session Time   Modalities Total Time for Session Not performed   Heat/Ice  CPT 38695 CP/MHP PRN    E. Stimulation Manual  CPT 06931 Trial TENS to lumbar paraspinals for pain modulation   E. Stim Unattended  CPT 39152    Ultrasound  CPT 18921    Manual   CPT 50593 Total Time for Session 8-22 Minutes   STM/MFR/TPR STM to R lumbar paraspinals; R piriformis active release    Instrument Assisted STM     Mobilizations Gr III CPA mobilizations to L3-L5   ROM/Flexibility Manual stretch R piriformis    Myofascial Decompression    Ther. Exercise  CPT 11868                   Group Total Time for Session 38-52 Minutes     Sets Reps Load Comment    Upright bike     When pt able to tolerate           Strengthening         TAC  1 10 5s    TAC w/ hip add  2 10 5s    TAC w/ BKFO  1 10 RTB    PPT  1 10 10s    PPT+ march   2 10     PPT+ SLR   1 10     Bridge w/ hip abd  2 10 RTB    Clamshells   2 10 RTB            Stretches        L/R lumbar rotation  1 10 3s    Hamstring stretch  1 3 30s Seated- pain in back in supine   Piriformis stretch  1 3 30s            Hip 3 way     TAC   Squat     PPT   Side stepping                                       Neuro Re-Ed  CPT 36236   Group Total Time for Session Not performed     Sets Reps Load Comment                                     Gait  CPT 48217 Group Total Time for Session Not performed                  Ther. Activity  CPT 62392 Group Total Time for Session 8-22 Minutes   Pt education  Pt educated on PT role, POC, initial examination findings, symptom management, initial HEP        Group  CPT 34172 Total Time for Session Not performed

## 2022-06-30 NOTE — PROGRESS NOTES
PT DAILY NOTE FOR OUTPATIENT THERAPY    Patient: Marcela Canseco MRN: [id number]  : 1990 31 y.o.  Referring Physician: Della Gallagher DO  Date of Visit: 2022    Certification Dates: 22 through 22    Diagnosis:   1. Chronic bilateral low back pain without sciatica        Chief Complaints:  LBP (R>L)    Precautions:   Existing Precautions/Restrictions: no known precautions/restrictions     TODAY'S VISIT    Time In Session:  Start Time: 08  Stop Time: 08  Time Calculation (min): 56 min   History/Vitals/Pain/Encounter Info - 22 0758        Injury History/Precautions/Daily Required Info    Document Type daily treatment     Chief Complaint/Reason for Visit  LBP (R>L)     Referring Physician Dr. Gallagher     Existing Precautions/Restrictions no known precautions/restrictions     History of present illness/functional impairment Marcela is 32 yo female who reports to OPPT with chief complaint of low back pain. She reports long history of on/off low back pain for 12 years. She states she had a baby last September (vaginal delivery) and during pregnancy she had piriformis syndrome which she was treated for at her chiropractor. She states she had an x-ray of lumbar spine done in December which showed degenerative disc disease. She states about 6 weeks ago she had onset of intense low back pain about a week after she ran "[employer] run. She states her pain has started to radiate to B hips, lower abdomen, thighs, and calves. Patient denies BLE N/T. She reports she saw chiropractor weekly during pregnancy and just started going again yesterday. She states chiropractor gave her adjustment yesterday which she feels relieved some of her pain. Patient states her pain is worst in the morning. She states her pain is aggravated by lifting, bending over to lift her baby, sitting for prolonged periods of time, and driving. Pt reports her pain is alleviated by happy baby yoga pose, heat/ice, stretching,  and laying down. She states her current pain is 7/10, pain at worst 10/10 and pain at best 5/10. She states her overall goal for PT is to get rid of her low back pain.     [facility]     Patient/Family/Caregiver Comments/Observations The patient reports improved back pain since last visit.     Patient reported fall since last visit No        Pain Assessment    Currently in pain Yes     Preferred Pain Scale number (Numeric Rating Pain Scale)     Pain Side/Orientation right     Pain: Body location Back;Buttock     Pain Rating (0-10): Pre Activity 5     Pain Rating (0-10): Post Activity 3        Pain Intervention    Intervention  MT, TE     Post Intervention Comments see assessment        Pre Activity Vital Signs    Oxygen Therapy None (Room air)                Daily Treatment Assessment and Plan - 06/30/22 0758        Daily Treatment Assessment and Plan    Progress toward goals Progressing     Daily Outcome Summary Marcela responded well to MT consisting of STM to address restrictions along her R piriformis and lumbar PA mobilizations to improve joint mobility. She reported reduction in pain post intervention. Exercise program was initiated with trunk stabilization. She responded well to verbal and tactile cuing for TAC and PPT. She completed exercise program without adverse effects. She will continue to benefit from skilled PT to improve trunk stabilization.     Plan and Recommendations Continue to progress program with standing trunk stabilization.                     OBJECTIVE DATA TAKEN TODAY:    None taken    Today's Treatment:    IE 6/23/22   30 Day 7/23/22   60 Day 8/23/22   90 Day    Precautions N/A; Did not respond to repeated flex or ext on IE   Insurance Auth    Treatment  Current Session Time   Modalities Total Time for Session Not performed   Heat/Ice  CPT 62804 CP/MHP PRN    E. Stimulation Manual  CPT 31958 Trial TENS to lumbar paraspinals for pain modulation   E. Stim Unattended  CPT 99730     Ultrasound  CPT 77935    Manual   CPT 99799 Total Time for Session 8-22 Minutes   STM/MFR/TPR STM to R lumbar paraspinals; R piriformis active release    Instrument Assisted STM     Mobilizations Gr III CPA mobilizations to L3-L5   ROM/Flexibility Manual stretch R piriformis    Myofascial Decompression    Ther. Exercise  CPT 95941                   Group Total Time for Session 38-52 Minutes     Sets Reps Load Comment    Upright bike     When pt able to tolerate           Strengthening         TAC  1 10 5s    TAC w/ hip add  2 10 5s    TAC w/ BKFO  1 10 RTB    PPT  1 10 10s    PPT+ march   2 10     PPT+ SLR   1 10     Bridge w/ hip abd  2 10 RTB    Clamshells   2 10 RTB            Stretches        L/R lumbar rotation  1 10 3s    Hamstring stretch  1 3 30s Seated- pain in back in supine   Piriformis stretch  1 3 30s            Hip 3 way     TAC   Squat     PPT   Side stepping                                       Neuro Re-Ed  CPT 97185   Group Total Time for Session Not performed     Sets Reps Load Comment                                     Gait  CPT 47878 Group Total Time for Session Not performed                  Ther. Activity  CPT 15856 Group Total Time for Session 8-22 Minutes   Pt education  Pt educated on PT role, POC, initial examination findings, symptom management, initial HEP        Group  CPT 64528 Total Time for Session Not performed            Laura García PT

## 2022-07-07 ENCOUNTER — HOSPITAL ENCOUNTER (OUTPATIENT)
Dept: PHYSICAL THERAPY | Facility: HOSPITAL | Age: 32
Setting detail: THERAPIES SERIES
Discharge: HOME | End: 2022-07-07
Attending: FAMILY MEDICINE
Payer: COMMERCIAL

## 2022-07-07 DIAGNOSIS — M54.50 CHRONIC BILATERAL LOW BACK PAIN WITHOUT SCIATICA: Primary | ICD-10-CM

## 2022-07-07 DIAGNOSIS — G89.29 CHRONIC BILATERAL LOW BACK PAIN WITHOUT SCIATICA: Primary | ICD-10-CM

## 2022-07-07 PROCEDURE — 97140 MANUAL THERAPY 1/> REGIONS: CPT | Mod: GP

## 2022-07-07 PROCEDURE — 97110 THERAPEUTIC EXERCISES: CPT | Mod: GP

## 2022-07-07 NOTE — OP PT TREATMENT LOG
IE 6/23/22   30 Day 7/23/22   60 Day 8/23/22   90 Day    Precautions N/A; Did not respond to repeated flex or ext on IE   Insurance Auth    Treatment  Current Session Time   Modalities Total Time for Session Not performed   Heat/Ice  CPT 54299 CP/MHP PRN    E. Stimulation Manual  CPT 60189 Trial TENS to lumbar paraspinals for pain modulation   E. Stim Unattended  CPT 90187    Ultrasound  CPT 93084    Manual   CPT 83635 Total Time for Session 8-22 Minutes   STM/MFR/TPR STM to R lumbar paraspinals; B/L piriformis active release    Instrument Assisted STM     Mobilizations Gr III CPA mobilizations to L3-L5; Shot gun technique   ROM/Flexibility Manual stretch B/L piriformis    Myofascial Decompression    Ther. Exercise  CPT 59342                   Group Total Time for Session 38-52 Minutes     Sets Reps Load Comment    Upright bike     When pt able to tolerate           Strengthening         TAC     HEP only   TAC w/ hip add  2 10 5s    TAC w/ BKFO  1 10 GTB    PPT     HEP only   PPT+ march 2 10     PPT+ SLR   2 10     Bridge w/ hip abd  2 10 RTB    Clamshells   2 10 RTB            Stretches        L/R lumbar rotation     HEP only    Hamstring stretch  1 3 30s Seated- pain in back in supine   Piriformis stretch  1 3 30s            Hip 2 way  1 10    TAC   Squat  NV   PPT   Side stepping  NV      Pall off press  2 10 RTB    Ext march 2 10  RTB     Kneeling half row   NV                                   Neuro Re-Ed  CPT 03051   Group Total Time for Session Not performed     Sets Reps Load Comment                                     Gait  CPT 30190 Group Total Time for Session Not performed                  Ther. Activity  CPT 12921 Group Total Time for Session Not performed   Pt education  Pt educated on PT role, POC, initial examination findings, symptom management, initial HEP        Group  CPT 90106 Total Time for Session Not performed

## 2022-07-07 NOTE — PROGRESS NOTES
PT DAILY NOTE FOR OUTPATIENT THERAPY    Patient: Marcela Canseco MRN: [id number]  : 1990 31 y.o.  Referring Physician: Della Gallagher DO  Date of Visit: 2022    Certification Dates: 22 through 22    Diagnosis:   1. Chronic bilateral low back pain without sciatica        Chief Complaints:  LBP (R>L)    Precautions:   Existing Precautions/Restrictions: no known precautions/restrictions     TODAY'S VISIT    Time In Session:  Start Time: 0802  Stop Time: 0858  Time Calculation (min): 56 min   History/Vitals/Pain/Encounter Info - 22 0800        Injury History/Precautions/Daily Required Info    Document Type daily treatment     Primary Therapist Laura García PT     Chief Complaint/Reason for Visit  LBP (R>L)     Referring Physician Dr. Gallagher     Existing Precautions/Restrictions no known precautions/restrictions     History of present illness/functional impairment Marcela is 30 yo female who reports to OPPT with chief complaint of low back pain. She reports long history of on/off low back pain for 12 years. She states she had a baby last September (vaginal delivery) and during pregnancy she had piriformis syndrome which she was treated for at her chiropractor. She states she had an x-ray of lumbar spine done in December which showed degenerative disc disease. She states about 6 weeks ago she had onset of intense low back pain about a week after she ran [employer] [address] run. She states her pain has started to radiate to B hips, lower abdomen, thighs, and calves. Patient denies BLE N/T. She reports she saw chiropractor weekly during pregnancy and just started going again yesterday. She states chiropractor gave her adjustment yesterday which she feels relieved some of her pain. Patient states her pain is worst in the morning. She states her pain is aggravated by lifting, bending over to lift her baby, sitting for prolonged periods of time, and driving. Pt reports her pain is alleviated by happy  baby yoga pose, heat/ice, stretching, and laying down. She states her current pain is 7/10, pain at worst 10/10 and pain at best 5/10. She states her overall goal for PT is to get rid of her low back pain.     Patient/Family/Caregiver Comments/Observations The patient reports that she was not very consistent with her HEP over the weekend, and she sat around a lot. Today, she is feeling bad pain in her hips and low back.     Patient reported fall since last visit No     [facility]        Pain Assessment    Currently in pain Yes     Preferred Pain Scale number (Numeric Rating Pain Scale)     Pain Side/Orientation --   L>R    Pain: Body location Hip;Back     Pain Rating (0-10): Pre Activity 7     Pain Rating (0-10): Activity 6     Pain Rating (0-10): Post Activity 4        Pain Intervention    Intervention  MT, TE     Post Intervention Comments see assessment        Pre Activity Vital Signs    Oxygen Therapy None (Room air)                Daily Treatment Assessment and Plan - 07/07/22 0800        Daily Treatment Assessment and Plan    Progress toward goals Progressing     Daily Outcome Summary Marcela responded well to today's treatment session, noting overall improvement in pain post session. She reported pain relief with mat program, but became frustrated with increase in pain during standing hip 2-way and marches. She was educated to utilized TAC for stabilization during standing program and exercises were modified in sets and repetitions. She will continue to benefit from skilled PT to improve trunk stabilization.     Plan and Recommendations Continue to progress standing TE as able.                     OBJECTIVE DATA TAKEN TODAY:    None taken    Today's Treatment:    IE 6/23/22   30 Day 7/23/22   60 Day 8/23/22   90 Day    Precautions N/A; Did not respond to repeated flex or ext on IE   Insurance Auth    Treatment  Current Session Time   Modalities Total Time for Session Not performed   Heat/Ice  CPT  49263 CP/MHP PRN    E. Stimulation Manual  CPT 77717 Trial TENS to lumbar paraspinals for pain modulation   E. Stim Unattended  CPT 56991    Ultrasound  CPT 02632    Manual   CPT 13166 Total Time for Session 8-22 Minutes   STM/MFR/TPR STM to R lumbar paraspinals; B/L piriformis active release    Instrument Assisted STM     Mobilizations Gr III CPA mobilizations to L3-L5; Shot gun technique   ROM/Flexibility Manual stretch B/L piriformis    Myofascial Decompression    Ther. Exercise  CPT 93325                   Group Total Time for Session 38-52 Minutes     Sets Reps Load Comment    Upright bike     When pt able to tolerate           Strengthening         TAC     HEP only   TAC w/ hip add  2 10 5s    TAC w/ BKFO  1 10 GTB    PPT     HEP only   PPT+ march 2 10     PPT+ SLR   2 10     Bridge w/ hip abd  2 10 RTB    Clamshells   2 10 RTB            Stretches        L/R lumbar rotation     HEP only    Hamstring stretch  1 3 30s Seated- pain in back in supine   Piriformis stretch  1 3 30s            Hip 2 way  1 10    TAC   Squat  NV   PPT   Side stepping  NV      Pall off press  2 10 RTB    Ext march 2 10  RTB     Kneeling half row   NV                                   Neuro Re-Ed  CPT 75733   Group Total Time for Session Not performed     Sets Reps Load Comment                                     Gait  CPT 60542 Group Total Time for Session Not performed                  Ther. Activity  CPT 35051 Group Total Time for Session Not performed   Pt education  Pt educated on PT role, POC, initial examination findings, symptom management, initial HEP        Group  CPT 13312 Total Time for Session Not performed            Laura García PT

## 2022-07-14 ENCOUNTER — HOSPITAL ENCOUNTER (OUTPATIENT)
Dept: PHYSICAL THERAPY | Facility: HOSPITAL | Age: 32
Setting detail: THERAPIES SERIES
Discharge: HOME | End: 2022-07-14
Attending: FAMILY MEDICINE
Payer: COMMERCIAL

## 2022-07-14 DIAGNOSIS — G89.29 CHRONIC BILATERAL LOW BACK PAIN WITHOUT SCIATICA: Primary | ICD-10-CM

## 2022-07-14 DIAGNOSIS — M54.50 CHRONIC BILATERAL LOW BACK PAIN WITHOUT SCIATICA: Primary | ICD-10-CM

## 2022-07-14 PROCEDURE — 97140 MANUAL THERAPY 1/> REGIONS: CPT | Mod: GP

## 2022-07-14 PROCEDURE — 97110 THERAPEUTIC EXERCISES: CPT | Mod: GP

## 2022-07-14 NOTE — PROGRESS NOTES
PT DAILY NOTE FOR OUTPATIENT THERAPY    Patient: Marcela Canseco MRN: [id number]  : 1990 31 y.o.  Referring Physician: Della Gallagher DO  Date of Visit: 2022    Certification Dates: 22 through 22    Diagnosis:   1. Chronic bilateral low back pain without sciatica        Chief Complaints:  LBP (R>L)    Precautions:   Existing Precautions/Restrictions: no known precautions/restrictions     TODAY'S VISIT    Time In Session:  Start Time: 807  Stop Time: 901  Time Calculation (min): 54 min   History/Vitals/Pain/Encounter Info - 22 0829        Injury History/Precautions/Daily Required Info    Document Type daily treatment     Primary Therapist Laura García PT     Chief Complaint/Reason for Visit  LBP (R>L)     Referring Physician Dr. Gallagher     Existing Precautions/Restrictions no known precautions/restrictions     History of present illness/functional impairment Marcela is 32 yo female who reports to OPPT with chief complaint of low back pain. She reports long history of on/off low back pain for 12 years. She states she had a baby last September (vaginal delivery) and during pregnancy she had piriformis syndrome which she was treated for at her chiropractor. She states she had an x-ray of lumbar spine done in December which showed degenerative disc disease. She states about 6 weeks ago she had onset of intense low back pain about a week after she ran [employer] [address] run. She states her pain has started to radiate to B hips, lower abdomen, thighs, and calves. Patient denies BLE N/T. She reports she saw chiropractor weekly during pregnancy and just started going again yesterday. She states chiropractor gave her adjustment yesterday which she feels relieved some of her pain. Patient states her pain is worst in the morning. She states her pain is aggravated by lifting, bending over to lift her baby, sitting for prolonged periods of time, and driving. Pt reports her pain is alleviated by happy  baby yoga pose, heat/ice, stretching, and laying down. She states her current pain is 7/10, pain at worst 10/10 and pain at best 5/10. She states her overall goal for PT is to get rid of her low back pain.     Patient/Family/Caregiver Comments/Observations Arrives with primary c/o thightness R low back this morning, denies any pain or radiating sx's. Pt reports she continues to go to chiropractor 2x/week, last visit was Monday.     Patient reported fall since last visit No        Pain Assessment    Currently in pain No/Denies        Pain Intervention    Intervention  TE, man tx     Post Intervention Comments see assessment        Pre Activity Vital Signs    Oxygen Therapy None (Room air)                Daily Treatment Assessment and Plan - 07/14/22 0829        Daily Treatment Assessment and Plan    Progress toward goals Progressing     Daily Outcome Summary Arrives this morning with primary c/o tightness R side of back. Session started with core stabilization on mat. VC for form, pacing and breath control. Progressed stabilization to include table top, ball at ankles to prevent pt crossing legs. Added sciatic n glide B/L, able to perform on R without any radic sx aggravation. Progression of core stabilization in standing with RTB per tx log. Added pball minisquat at wall, no issues. Pt educ for bend/lift mechanics, verbally reviewed and therapist demonstration, follow up at next visit as able. No change in pain end of session, remains with primary c/o tightness R low back, though overall feeling looser from start of session. HEP updated with handout to include supine sciatic n glide, pt understands motion should be pain-free.     Plan and Recommendations Continue to progress standing TE as able. Add bend/lift mechanics as able.                     OBJECTIVE DATA TAKEN TODAY:    None taken    Today's Treatment:    IE 6/23/22   30 Day 7/23/22   60 Day 8/23/22   90 Day    Precautions N/A; Did not respond to repeated  flex or ext on IE   Insurance Auth    Treatment  Current Session Time   Modalities Total Time for Session Not performed   Heat/Ice  CPT 52278 CP/MHP PRN    E. Stimulation Manual  CPT 99600 Trial TENS to lumbar paraspinals for pain modulation   E. Stim Unattended  CPT 40313    Ultrasound  CPT 66304    Manual   CPT 86876 Total Time for Session 8-22 Minutes   STM/MFR/TPR STM to R lumbar paraspinals;   B/L piriformis active release -n   Instrument Assisted STM     Mobilizations Gr III CPA mobilizations to L3-L5;   Shot gun technique-n   ROM/Flexibility Manual stretch   Prone quad B/L  B/L piriformis -n   Myofascial Decompression    Ther. Exercise  CPT 21604                   Group Total Time for Session 38-52 Minutes     Sets Reps Load Comment    Upright bike     When pt able to tolerate           Strengthening         TAC     HEP only   TAC w/ hip add  2 10 5s    TAC w/ BKFO  2 10 GTB    TAC + tabletop  1 8 10s Ball at ankles   PPT     HEP only   PPT+ march 2 10     PPT+ SLR   2 10     Bridge w/ hip abd  1 10 RTB    Clamshells   2 10 RTB n           Stretches        L/R lumbar rotation     HEP only    Hamstring stretch  1 3 30s n  Seated- pain in back in supine   Piriformis stretch  1 3 30s    Sciatic n glide  3 10  supine           Hip 2 way  1 10    n  TAC   Squat  1 10  PPT; pball sq   Side stepping  NV      Pall off press  Row  B/L ext  2  1  1 10  10  10 RTB  RTB  RTB    Ext march 2 10  RTB  n   Kneeling half row   NV                                   Neuro Re-Ed  CPT 50153   Group Total Time for Session Not performed     Sets Reps Load Comment                                     Gait  CPT 26172 Group Total Time for Session Not performed                  Ther. Activity  CPT 78924 Group Total Time for Session Not performed   Pt education  Pt educated on PT role, POC, initial examination findings, symptom management, initial HEP     Bend/Lift education - NV        Group  CPT 99845 Total Time for Session Not  performed            Janett Ruiz PT, DPT

## 2022-07-14 NOTE — OP PT TREATMENT LOG
IE 6/23/22   30 Day 7/23/22   60 Day 8/23/22   90 Day    Precautions N/A; Did not respond to repeated flex or ext on IE   Insurance Auth    Treatment  Current Session Time   Modalities Total Time for Session Not performed   Heat/Ice  CPT 91491 CP/MHP PRN    E. Stimulation Manual  CPT 27723 Trial TENS to lumbar paraspinals for pain modulation   E. Stim Unattended  CPT 52869    Ultrasound  CPT 12905    Manual   CPT 57304 Total Time for Session 8-22 Minutes   STM/MFR/TPR STM to R lumbar paraspinals;   B/L piriformis active release -n   Instrument Assisted STM     Mobilizations Gr III CPA mobilizations to L3-L5;   Shot gun technique-n   ROM/Flexibility Manual stretch   Prone quad B/L  B/L piriformis -n   Myofascial Decompression    Ther. Exercise  CPT 94297                   Group Total Time for Session 38-52 Minutes     Sets Reps Load Comment    Upright bike     When pt able to tolerate           Strengthening         TAC     HEP only   TAC w/ hip add  2 10 5s    TAC w/ BKFO  2 10 GTB    TAC + tabletop  1 8 10s Ball at ankles   PPT     HEP only   PPT+ march 2 10     PPT+ SLR   2 10     Bridge w/ hip abd  1 10 RTB    Clamshells   2 10 RTB n           Stretches        L/R lumbar rotation     HEP only    Hamstring stretch  1 3 30s n  Seated- pain in back in supine   Piriformis stretch  1 3 30s    Sciatic n glide  3 10  supine           Hip 2 way  1 10    n  TAC   Squat  1 10  PPT; pball sq   Side stepping  NV      Pall off press  Row  B/L ext  2  1  1 10  10  10 RTB  RTB  RTB    Ext march 2 10  RTB  n   Kneeling half row   NV                                   Neuro Re-Ed  CPT 61587   Group Total Time for Session Not performed     Sets Reps Load Comment                                     Gait  CPT 29087 Group Total Time for Session Not performed                  Ther. Activity  CPT 53157 Group Total Time for Session Not performed   Pt education  Pt educated on PT role, POC, initial examination findings, symptom  management, initial HEP     Bend/Lift education - NV        Group  CPT 36172 Total Time for Session Not performed

## 2022-08-02 DIAGNOSIS — G89.29 CHRONIC BILATERAL LOW BACK PAIN WITHOUT SCIATICA: ICD-10-CM

## 2022-08-02 DIAGNOSIS — M54.50 CHRONIC BILATERAL LOW BACK PAIN WITHOUT SCIATICA: ICD-10-CM

## 2022-08-02 RX ORDER — MELOXICAM 15 MG/1
15 TABLET ORAL DAILY
Qty: 30 TABLET | Refills: 0 | Status: ON HOLD | OUTPATIENT
Start: 2022-08-02 | End: 2023-05-29

## 2022-08-02 RX ORDER — TIZANIDINE 2 MG/1
TABLET ORAL
Qty: 90 TABLET | Refills: 0 | Status: ON HOLD | OUTPATIENT
Start: 2022-08-02 | End: 2023-05-29 | Stop reason: ALTCHOICE

## 2022-08-09 NOTE — PROGRESS NOTES
PT DISCHARGE NOTE FOR OUTPATIENT THERAPY    Patient: Marcela Canseco MRN: [id number]  : 1990 31 y.o.  Referring Physician: No ref. provider found  Date of Visit: 2022      Certification Dates:   through      Total Visit Count: 4    Chief Complaints:  No chief complaint on file.      Precautions:        TODAY'S VISIT:    Time In Session:                 OBJECTIVE MEASUREMENTS/DATA:    None taken    ROM and MMT        Some values may be hidden. Unless noted otherwise, only the newest values recorded on each date are displayed.         PT UE ROM Measurements 22   No data to display.      PT LE ROM Measurements 22   AROM: Right LE Gross Movement WFL   AROM: Left LE Gross Movement WFL      PT Cervical/Lumbar/Other ROM Measurements 22   AROM: Lumbar Flexion   <25%, (+) pain   AROM: Lumar Extension   <25%, no pain   AROM: Left Lumbar SB   59 cm from middle finger to floor   AROM: Right Lumbar SB   59 cm from middle finger to floor   AROM: Left Lumbar Rotation   66 deg   AROM: Right Lumbar Rotation   68 deg   AROM: Lumbar Comments RFIS: pt unable to complete 10 repetitions d/t incr'd pain; ANUPAM: x10 incr'd LBP from 3/10 to /10      Hand ROM Measurements 22   No data to display.      PT UE MMT Measurements 22   No data to display.      PT LE MMT 22   Right Hip Flexion (3+/5) fair plus   Left Hip Flexion (4-/5) good minus   Right Hip Extension (3+/5) fair plus   Left Hip Extension (4-/5) good minus   Right Hip ABD (4-/5) good minus   Left Hip ABD (4-/5) good minus   Right Hip ADD (4-/5) good minus   Left Hip ADD (4-/5) good minus   Right Hip IR (4-/5) good minus   Left Hip IR (4-/5) good minus   Right Hip ER (4-/5) good minus   Left Hip ER (4-/5) good minus   Right Knee Flexion (4-/5) good minus   Left Knee Flexion (4-/5) good minus   Right Knee Extension (4-/5) good minus  (+) pain   Left Knee Extension (4-/5) good minus   Right Ankle DF (4-/5) good minus   Left Ankle DF (4-/5)  good minus   Right Ankle PF (4-/5) good minus   Left Ankle PF (4-/5) good minus           Outcome Measures    PT OBJECTIVE Outcome Measures 6/23/22   No data to display.      PT SUBJECTIVE Outcome Measures 6/23/22   PSFS Total Score (%) 30 %   Oswestry 30/50 (60%)             Today's Treatment:    Education provided:  None/NA         Goals Addressed                    This Visit's Progress    •  COMPLETED: LBP goals 2022         Unable to assess goals, no visit discharge 8/9/22  Short Term Goals Time Frame Result Comment/Progress   Patient will report < 4/10 pain at worst on avg in order to improve functional performance.   4 Weeks     Patient will improve Oswestry score by 7 points in order to demonstrate self-perceived functional performance.   4 Weeks     Patient will improve lumbar AROM by 25% or 10 deg in all directions.   4 Weeks     Patient will be IND in HEP.   4 Weeks     Patient will demonstrate ability to perform TAC IND w/o tactile or verbal cueing to demonstrate proper muscle recruitment/motor control.  4 Weeks     Patient will demonstrate (-) BLE slump test 4 Weeks     Patient will improve PSFS outcome measure scores by 2 points in each category in order to demonstrate self-perceived functional improvement.    4 Weeks       Long Term Goals Time Frame Result Comment/Progress   Patient will report 0/10 pain at worst on avg in order to improve functional performance.   8 Weeks     Patient will improve Oswestry score by 14 points in order to demonstrate self-perceived functional performance.   8 Weeks     Patient will improve lumbar AROM by 50% or 20 deg in all directions.   8 Weeks     Patient will be IND with HEP upon discharge for carryover of functional gains.   8 Weeks     Patient will improve PSFS outcome measure scores by 4 points in each category in order to demonstrate self-perceived functional improvement.  8 Weeks        8 Weeks              •  COMPLETED: Mutually agreed upon pain goal          Unable to assess goals, no visit discharge  Mutually agreed upon pain goal: 1/10        •  COMPLETED: Patient stated LBP (pt-stated)         Unable to assess goals, no visit discharge  Patient reports her overall goal for PT is to get rid of her low back pain.             The patient canceled the remainder of her appointments due to COVID-19 exposure. Multiple attempts were made to schedule patient after her quarantine period, however she did not return phone calls. She will be a no visit discharge at this time.    Laura García, PT

## 2022-09-19 ENCOUNTER — APPOINTMENT (RX ONLY)
Dept: URBAN - METROPOLITAN AREA CLINIC 23 | Facility: CLINIC | Age: 32
Setting detail: DERMATOLOGY
End: 2022-09-19

## 2022-09-19 DIAGNOSIS — Z87.2 PERSONAL HISTORY OF DISEASES OF THE SKIN AND SUBCUTANEOUS TISSUE: ICD-10-CM

## 2022-09-19 DIAGNOSIS — D22 MELANOCYTIC NEVI: ICD-10-CM

## 2022-09-19 DIAGNOSIS — L81.4 OTHER MELANIN HYPERPIGMENTATION: ICD-10-CM

## 2022-09-19 DIAGNOSIS — L81.1 CHLOASMA: ICD-10-CM | Status: INADEQUATELY CONTROLLED

## 2022-09-19 PROBLEM — D22.5 MELANOCYTIC NEVI OF TRUNK: Status: ACTIVE | Noted: 2022-09-19

## 2022-09-19 PROCEDURE — 99214 OFFICE O/P EST MOD 30 MIN: CPT

## 2022-09-19 PROCEDURE — ? SUNSCREEN RECOMMENDATIONS

## 2022-09-19 PROCEDURE — ? PRESCRIPTION MEDICATION MANAGEMENT

## 2022-09-19 PROCEDURE — ? COUNSELING

## 2022-09-19 PROCEDURE — ? SKIN MEDICINALS

## 2022-09-19 ASSESSMENT — LOCATION DETAILED DESCRIPTION DERM
LOCATION DETAILED: LEFT SUPERIOR MEDIAL UPPER BACK
LOCATION DETAILED: RIGHT UPPER CUTANEOUS LIP
LOCATION DETAILED: RIGHT MEDIAL SUPERIOR CHEST
LOCATION DETAILED: LEFT UPPER CUTANEOUS LIP

## 2022-09-19 ASSESSMENT — LOCATION ZONE DERM
LOCATION ZONE: TRUNK
LOCATION ZONE: LIP

## 2022-09-19 ASSESSMENT — LOCATION SIMPLE DESCRIPTION DERM
LOCATION SIMPLE: LEFT UPPER BACK
LOCATION SIMPLE: CHEST
LOCATION SIMPLE: LEFT LIP
LOCATION SIMPLE: RIGHT LIP

## 2022-09-19 NOTE — PROCEDURE: PRESCRIPTION MEDICATION MANAGEMENT
Render In Strict Bullet Format?: No
Initiate Treatment: Skin Medicinals Hydroquinone 8%, Tretinoin 0.025%, Kojic Acid 1%, Niacinamide 4%, Fluocinolone 0.025% Cream - Apply to dark spots qhs for 4-5 months or until lightened, then discontinue\\n\\nSPF 30 qday to face
Detail Level: Simple

## 2022-09-19 NOTE — PROCEDURE: SKIN MEDICINALS
Sig: Apply to affected areas twice daily
Sig: Apply to affected areas on face twice daily
Sig: Take one twice daily
Sig: Apply pea sized amount per area at night
Sig: Wash affected areas daily.
Sig: Apply nightly to warts nightly under occlusion
Sig: Apply a thin layer to the itching areas twice daily as needed
Sig: Apply a thin layer to the affected areas daily
Detail Level: Simple
Intro Statement: I recommended the following products:
Sig: Apply a thin layer to the affected skin twice daily
Sig: Apply a thin layer to the affected areas twice daily
Sig: Apply twice daily for 5 days
Product Type (1): Lightening Cream
Sig: Apply a thin layer to the scar daily
Lightening Cream: Hydroquinone 8%, Tretinoin 0.025%, Kojic Acid 1%, Niacinamide 4%, Fluocinolone 0.025% Cream
Sig: Apply to face once daily.
Sig: Apply nightly to warts nightly with bandaids. Wash off in the morning with soap and water.
Sig: Apply twice daily to scalp for 5 days.
Sig: Apply to dark spots qhs for 4-5 months or until lightened, then discontinue.

## 2022-10-10 LAB
HBV SURFACE AG SER QL: NONREACTIVE
HCV AB SER QL: NONREACTIVE
HIV 1+2 AB+HIV1 P24 AG SERPL QL IA: NONREACTIVE
QST CHLAMYDIA TRACHOMATIS RNA, TMA: NEGATIVE
QST NEISSERIA GONORRHOEAE RNA, TMA: NEGATIVE
T PALLIDUM AB SER QL IF: NONREACTIVE

## 2022-10-17 ENCOUNTER — TRANSCRIBE ORDERS (OUTPATIENT)
Dept: SCHEDULING | Age: 32
End: 2022-10-17

## 2022-10-17 DIAGNOSIS — Z36.82 ENCOUNTER FOR ANTENATAL SCREENING FOR NUCHAL TRANSLUCENCY: Primary | ICD-10-CM

## 2022-11-14 ENCOUNTER — HOSPITAL ENCOUNTER (OUTPATIENT)
Dept: PERINATAL CARE | Facility: HOSPITAL | Age: 32
Discharge: HOME | End: 2022-11-14
Attending: NURSE PRACTITIONER
Payer: COMMERCIAL

## 2022-11-14 DIAGNOSIS — O36.80X0 ENCOUNTER TO DETERMINE FETAL VIABILITY OF PREGNANCY, SINGLE OR UNSPECIFIED FETUS: Primary | ICD-10-CM

## 2022-11-14 DIAGNOSIS — Z36.3 ANTENATAL SCREENING FOR MALFORMATION USING ULTRASONICS: ICD-10-CM

## 2022-11-14 DIAGNOSIS — Z3A.12 12 WEEKS GESTATION OF PREGNANCY: ICD-10-CM

## 2022-11-14 DIAGNOSIS — Z36.82 ENCOUNTER FOR ANTENATAL SCREENING FOR NUCHAL TRANSLUCENCY: ICD-10-CM

## 2022-11-14 PROCEDURE — 76801 OB US < 14 WKS SINGLE FETUS: CPT

## 2022-12-05 ENCOUNTER — TRANSCRIBE ORDERS (OUTPATIENT)
Dept: SCHEDULING | Age: 32
End: 2022-12-05

## 2022-12-05 DIAGNOSIS — Z36.3 ENCOUNTER FOR ANTENATAL SCREENING FOR MALFORMATIONS: Primary | ICD-10-CM

## 2023-01-12 ENCOUNTER — HOSPITAL ENCOUNTER (OUTPATIENT)
Dept: PERINATAL CARE | Facility: HOSPITAL | Age: 33
Discharge: HOME | End: 2023-01-12
Attending: OBSTETRICS & GYNECOLOGY
Payer: COMMERCIAL

## 2023-01-12 DIAGNOSIS — Z36.3 ENCOUNTER FOR ANTENATAL SCREENING FOR MALFORMATIONS: Primary | ICD-10-CM

## 2023-01-12 DIAGNOSIS — Z3A.20 20 WEEKS GESTATION OF PREGNANCY: ICD-10-CM

## 2023-01-12 PROCEDURE — 76805 OB US >/= 14 WKS SNGL FETUS: CPT

## 2023-05-01 LAB — GP B STREP SPEC QL CULT: NORMAL

## 2023-05-29 ENCOUNTER — HOSPITAL ENCOUNTER (INPATIENT)
Facility: HOSPITAL | Age: 33
LOS: 2 days | Discharge: HOME | End: 2023-05-31
Attending: OBSTETRICS & GYNECOLOGY | Admitting: OBSTETRICS & GYNECOLOGY
Payer: COMMERCIAL

## 2023-05-29 ENCOUNTER — ANESTHESIA (INPATIENT)
Dept: OBSTETRICS AND GYNECOLOGY | Facility: HOSPITAL | Age: 33
End: 2023-05-29
Payer: COMMERCIAL

## 2023-05-29 ENCOUNTER — ANESTHESIA EVENT (INPATIENT)
Dept: OBSTETRICS AND GYNECOLOGY | Facility: HOSPITAL | Age: 33
End: 2023-05-29
Payer: COMMERCIAL

## 2023-05-29 PROBLEM — Z3A.40 40 WEEKS GESTATION OF PREGNANCY: Status: ACTIVE | Noted: 2023-05-29

## 2023-05-29 LAB
ABO + RH BLD: NORMAL
BLD GP AB SCN SERPL QL: NEGATIVE
D AG BLD QL: POSITIVE
ERYTHROCYTE [DISTWIDTH] IN BLOOD BY AUTOMATED COUNT: 13.9 % (ref 11.7–14.4)
HCT VFR BLDCO AUTO: 36.5 % (ref 35–45)
HGB BLD-MCNC: 11.7 G/DL (ref 11.8–15.7)
LABORATORY COMMENT REPORT: NORMAL
MCH RBC QN AUTO: 27 PG (ref 28–33.2)
MCHC RBC AUTO-ENTMCNC: 32.1 G/DL (ref 32.2–35.5)
MCV RBC AUTO: 84.3 FL (ref 83–98)
PDW BLD AUTO: 9.5 FL (ref 9.4–12.3)
PLATELET # BLD AUTO: 319 K/UL (ref 150–369)
RBC # BLD AUTO: 4.33 M/UL (ref 3.93–5.22)
SPECIMEN EXP DATE BLD: NORMAL
WBC # BLD AUTO: 11.4 K/UL (ref 3.8–10.5)

## 2023-05-29 PROCEDURE — 36415 COLL VENOUS BLD VENIPUNCTURE: CPT | Performed by: OBSTETRICS & GYNECOLOGY

## 2023-05-29 PROCEDURE — 86780 TREPONEMA PALLIDUM: CPT | Performed by: OBSTETRICS & GYNECOLOGY

## 2023-05-29 PROCEDURE — 63600000 HC DRUGS/DETAIL CODE: Performed by: OBSTETRICS & GYNECOLOGY

## 2023-05-29 PROCEDURE — 10907ZC DRAINAGE OF AMNIOTIC FLUID, THERAPEUTIC FROM PRODUCTS OF CONCEPTION, VIA NATURAL OR ARTIFICIAL OPENING: ICD-10-PCS | Performed by: OBSTETRICS & GYNECOLOGY

## 2023-05-29 PROCEDURE — 72000012 HC VAGINAL DELIVERY LEVEL 2

## 2023-05-29 PROCEDURE — 86901 BLOOD TYPING SEROLOGIC RH(D): CPT

## 2023-05-29 PROCEDURE — 37000005 HC ANESTHESIA EPIDURAL/SPINAL: Performed by: ANESTHESIOLOGY

## 2023-05-29 PROCEDURE — 63700000 HC SELF-ADMINISTRABLE DRUG: Performed by: OBSTETRICS & GYNECOLOGY

## 2023-05-29 PROCEDURE — 27200130 HC EPIDURAL ANES TRAY

## 2023-05-29 PROCEDURE — 25800000 HC PHARMACY IV SOLUTIONS: Performed by: OBSTETRICS & GYNECOLOGY

## 2023-05-29 PROCEDURE — 12000000 HC ROOM AND CARE MED/SURG

## 2023-05-29 PROCEDURE — 0KQM0ZZ REPAIR PERINEUM MUSCLE, OPEN APPROACH: ICD-10-PCS | Performed by: OBSTETRICS & GYNECOLOGY

## 2023-05-29 PROCEDURE — 25000000 HC PHARMACY GENERAL: Performed by: ANESTHESIOLOGY

## 2023-05-29 PROCEDURE — 85027 COMPLETE CBC AUTOMATED: CPT | Performed by: OBSTETRICS & GYNECOLOGY

## 2023-05-29 PROCEDURE — 87340 HEPATITIS B SURFACE AG IA: CPT | Performed by: OBSTETRICS & GYNECOLOGY

## 2023-05-29 RX ORDER — NAPROXEN SODIUM 220 MG/1
81 TABLET, FILM COATED ORAL DAILY
COMMUNITY
End: 2023-05-31 | Stop reason: HOSPADM

## 2023-05-29 RX ORDER — DIPHENHYDRAMINE HCL 25 MG
25 CAPSULE ORAL EVERY 6 HOURS PRN
Status: DISCONTINUED | OUTPATIENT
Start: 2023-05-29 | End: 2023-05-31 | Stop reason: HOSPADM

## 2023-05-29 RX ORDER — MISOPROSTOL 200 UG/1
1000 TABLET ORAL ONCE AS NEEDED
Status: DISCONTINUED | OUTPATIENT
Start: 2023-05-29 | End: 2023-05-29

## 2023-05-29 RX ORDER — CARBOPROST TROMETHAMINE 250 UG/ML
250 INJECTION, SOLUTION INTRAMUSCULAR ONCE AS NEEDED
Status: DISCONTINUED | OUTPATIENT
Start: 2023-05-29 | End: 2023-05-29

## 2023-05-29 RX ORDER — ONDANSETRON 4 MG/1
4 TABLET, ORALLY DISINTEGRATING ORAL EVERY 8 HOURS PRN
Status: DISCONTINUED | OUTPATIENT
Start: 2023-05-29 | End: 2023-05-31 | Stop reason: HOSPADM

## 2023-05-29 RX ORDER — DIPHENHYDRAMINE HCL 50 MG/ML
25 VIAL (ML) INJECTION EVERY 6 HOURS PRN
Status: DISCONTINUED | OUTPATIENT
Start: 2023-05-29 | End: 2023-05-31 | Stop reason: HOSPADM

## 2023-05-29 RX ORDER — IBUPROFEN 600 MG/1
600 TABLET ORAL EVERY 6 HOURS PRN
Status: DISCONTINUED | OUTPATIENT
Start: 2023-05-29 | End: 2023-05-31 | Stop reason: HOSPADM

## 2023-05-29 RX ORDER — METHYLERGONOVINE MALEATE 0.2 MG/ML
200 INJECTION INTRAVENOUS ONCE AS NEEDED
Status: DISCONTINUED | OUTPATIENT
Start: 2023-05-29 | End: 2023-05-29

## 2023-05-29 RX ORDER — ALUMINUM HYDROXIDE, MAGNESIUM HYDROXIDE, AND SIMETHICONE 1200; 120; 1200 MG/30ML; MG/30ML; MG/30ML
30 SUSPENSION ORAL EVERY 4 HOURS PRN
Status: DISCONTINUED | OUTPATIENT
Start: 2023-05-29 | End: 2023-05-31 | Stop reason: HOSPADM

## 2023-05-29 RX ORDER — OXYCODONE HYDROCHLORIDE 5 MG/1
5 TABLET ORAL EVERY 6 HOURS PRN
Status: DISCONTINUED | OUTPATIENT
Start: 2023-05-29 | End: 2023-05-31 | Stop reason: HOSPADM

## 2023-05-29 RX ORDER — OXYTOCIN 10 [USP'U]/ML
10 INJECTION, SOLUTION INTRAMUSCULAR; INTRAVENOUS ONCE AS NEEDED
Status: DISCONTINUED | OUTPATIENT
Start: 2023-05-29 | End: 2023-05-29

## 2023-05-29 RX ORDER — TRANEXAMIC ACID 10 MG/ML
1000 INJECTION, SOLUTION INTRAVENOUS ONCE AS NEEDED
Status: DISCONTINUED | OUTPATIENT
Start: 2023-05-29 | End: 2023-05-29

## 2023-05-29 RX ORDER — ACETAMINOPHEN 325 MG/1
650 TABLET ORAL EVERY 4 HOURS PRN
Status: DISCONTINUED | OUTPATIENT
Start: 2023-05-29 | End: 2023-05-31 | Stop reason: HOSPADM

## 2023-05-29 RX ORDER — FENTANYL/ROPIVACAINE/NS/PF 2-1500 MCG
PREFILLED PUMP RESERVOIR INJECTION CONTINUOUS
Status: DISCONTINUED | OUTPATIENT
Start: 2023-05-29 | End: 2023-05-29

## 2023-05-29 RX ORDER — OXYTOCIN/0.9 % SODIUM CHLORIDE 40/1000ML
500 PLASTIC BAG, INJECTION (ML) INTRAVENOUS ONCE
Status: COMPLETED | OUTPATIENT
Start: 2023-05-29 | End: 2023-05-29

## 2023-05-29 RX ORDER — CALCIUM CARBONATE 200(500)MG
500 TABLET,CHEWABLE ORAL EVERY 4 HOURS PRN
Status: DISCONTINUED | OUTPATIENT
Start: 2023-05-29 | End: 2023-05-31 | Stop reason: HOSPADM

## 2023-05-29 RX ORDER — AMOXICILLIN 250 MG
1 CAPSULE ORAL 2 TIMES DAILY
Status: DISCONTINUED | OUTPATIENT
Start: 2023-05-29 | End: 2023-05-31 | Stop reason: HOSPADM

## 2023-05-29 RX ORDER — SODIUM CHLORIDE, SODIUM LACTATE, POTASSIUM CHLORIDE, CALCIUM CHLORIDE 600; 310; 30; 20 MG/100ML; MG/100ML; MG/100ML; MG/100ML
125 INJECTION, SOLUTION INTRAVENOUS CONTINUOUS
Status: DISCONTINUED | OUTPATIENT
Start: 2023-05-29 | End: 2023-05-29

## 2023-05-29 RX ORDER — LIDOCAINE HYDROCHLORIDE AND EPINEPHRINE 15; 5 MG/ML; UG/ML
INJECTION, SOLUTION EPIDURAL
Status: COMPLETED | OUTPATIENT
Start: 2023-05-29 | End: 2023-05-29

## 2023-05-29 RX ORDER — CALCIUM CARBONATE 200(500)MG
2 TABLET,CHEWABLE ORAL AS NEEDED
COMMUNITY

## 2023-05-29 RX ORDER — LIDOCAINE HYDROCHLORIDE 10 MG/ML
0-30 INJECTION, SOLUTION EPIDURAL; INFILTRATION; INTRACAUDAL; PERINEURAL ONCE AS NEEDED
Status: DISCONTINUED | OUTPATIENT
Start: 2023-05-29 | End: 2023-05-29

## 2023-05-29 RX ORDER — OXYTOCIN/0.9 % SODIUM CHLORIDE 40/1000ML
PLASTIC BAG, INJECTION (ML) INTRAVENOUS CONTINUOUS
Status: DISCONTINUED | OUTPATIENT
Start: 2023-05-29 | End: 2023-05-29

## 2023-05-29 RX ORDER — DIBUCAINE 1 %
1 OINTMENT (GRAM) TOPICAL AS NEEDED
Status: DISCONTINUED | OUTPATIENT
Start: 2023-05-29 | End: 2023-05-31 | Stop reason: HOSPADM

## 2023-05-29 RX ORDER — ONDANSETRON HYDROCHLORIDE 2 MG/ML
4 INJECTION, SOLUTION INTRAVENOUS EVERY 8 HOURS PRN
Status: DISCONTINUED | OUTPATIENT
Start: 2023-05-29 | End: 2023-05-31 | Stop reason: HOSPADM

## 2023-05-29 RX ORDER — EPHEDRINE SULFATE/0.9% NACL/PF 50 MG/5 ML
10 SYRINGE (ML) INTRAVENOUS EVERY 10 MIN PRN
Status: DISCONTINUED | OUTPATIENT
Start: 2023-05-29 | End: 2023-05-29

## 2023-05-29 RX ORDER — IBUPROFEN 600 MG/1
600 TABLET ORAL ONCE
Status: COMPLETED | OUTPATIENT
Start: 2023-05-29 | End: 2023-05-29

## 2023-05-29 RX ADMIN — Medication 50 ML: at 16:03

## 2023-05-29 RX ADMIN — SODIUM CHLORIDE, POTASSIUM CHLORIDE, SODIUM LACTATE AND CALCIUM CHLORIDE 125 ML/HR: 600; 310; 30; 20 INJECTION, SOLUTION INTRAVENOUS at 16:05

## 2023-05-29 RX ADMIN — ONDANSETRON HYDROCHLORIDE 4 MG: 2 INJECTION, SOLUTION INTRAMUSCULAR; INTRAVENOUS at 20:02

## 2023-05-29 RX ADMIN — ACETAMINOPHEN 650 MG: 325 TABLET ORAL at 20:02

## 2023-05-29 RX ADMIN — OXYTOCIN: 10 INJECTION, SOLUTION INTRAMUSCULAR; INTRAVENOUS at 17:41

## 2023-05-29 RX ADMIN — IBUPROFEN 600 MG: 600 TABLET, FILM COATED ORAL at 18:57

## 2023-05-29 RX ADMIN — OXYTOCIN 20 UNITS: 10 INJECTION, SOLUTION INTRAMUSCULAR; INTRAVENOUS at 17:10

## 2023-05-29 RX ADMIN — LIDOCAINE HYDROCHLORIDE,EPINEPHRINE BITARTRATE 3 ML: 15; .005 INJECTION, SOLUTION EPIDURAL; INFILTRATION; INTRACAUDAL; PERINEURAL at 16:20

## 2023-05-29 RX ADMIN — OXYCODONE HYDROCHLORIDE 5 MG: 5 TABLET ORAL at 20:20

## 2023-05-29 RX ADMIN — OXYTOCIN 20 UNITS: 10 INJECTION, SOLUTION INTRAMUSCULAR; INTRAVENOUS at 17:13

## 2023-05-29 RX ADMIN — Medication 10 ML: at 16:22

## 2023-05-29 RX ADMIN — SODIUM CHLORIDE, POTASSIUM CHLORIDE, SODIUM LACTATE AND CALCIUM CHLORIDE 1000 ML: 600; 310; 30; 20 INJECTION, SOLUTION INTRAVENOUS at 15:40

## 2023-05-29 RX ADMIN — SENNOSIDES AND DOCUSATE SODIUM 1 TABLET: 50; 8.6 TABLET ORAL at 20:04

## 2023-05-29 ASSESSMENT — PAIN SCALES - GENERAL: PAIN_LEVEL: 4

## 2023-05-29 NOTE — ANESTHESIA PROCEDURE NOTES
Epidural Block    Patient location during procedure: OB  Start time: 5/29/2023 4:10 PM  End time: 5/29/2023 4:24 PM  Reason for block: labor analgesia requested by patient and obstetrician  Staffing  Performed: anesthesiologist   Anesthesiologist: Jesus Alberto Rosas MD  Performed by: Jesus Alberto Rosas MD  Authorized by: Jesus Alberto Rosas MD    Preanesthetic Checklist  Completed: patient identified, surgical consent, pre-op evaluation, timeout performed, IV checked, risks and benefits discussed, monitors and equipment checked and sterile field maintained during procedure  Needle  Needle gauge: 17 G  Needle length: 3.5 in  Catheter type: Single orifice  Catheter size: 19 G  Test dose: negative and lidocaine 1.5% with epinephrine 1-to-200,000  Additional Notes  Procedure well tolerated. Vital signs stable. No paresthesia.  Analgesia satisfactory. Patients nurse to notify anesthesiologist if hemodynamically unstable.    Medications Administered -   lidocaine 1.5%-EPINEPHrine 1:200,000 PF (XYLOCAINE W/EPI) injection - epidural   3 mL - 5/29/2023 4:20:00 PM

## 2023-05-29 NOTE — PROGRESS NOTES
Labor and Delivery Progress Note    Subjective     Interval History: complains of inadequate relief with epidura;.     Patient uncomfortable    Objective     Vital Signs for the last 24 hours:  Temp:  [36.6 °C (97.9 °F)] 36.6 °C (97.9 °F)  Heart Rate:  [61-94] 91  Resp:  [18] 18  BP: (120-135)/(65-81) 120/73     Fetal Monitoring:  FHR Baseline: 120  FHR Variability: moderate  FHR Accelerations: present  FHR Decelerations: absent    Contraction Frequency: q 3 to 5    IUPC: no    Latest cervical exam:  Cervical Dilation (cm): 7-8  Cervical Effacement: 100  Fetal Station: +1  Method: sterile exam per physician (05/29/23 1647)    Vaginal Bleeding: not present (05/29/23 1530)      Current Facility-Administered Medications:     carboprost (HEMABATE) injection 250 mcg, 250 mcg, intramuscular, Once PRN, Manuel Albert MD    ePHEDrine sulfate-0.9%NaCl(PF) 50 mg/5 mL (10 mg/mL) injection 10 mg, 10 mg, intravenous, q10 min PRN, Jesus Alberto Rosas MD    fentaNYL-ropivacaine-NaCl (PF) 2 mcg/mL - 0.15% PCEA syringe, , epidural, Continuous, Jesus Alberto Rosas MD, Last Rate: 10 mL/hr at 05/29/23 1603, 10 mL at 05/29/23 1622    [COMPLETED] lactated ringer's bolus 1,000 mL, 1,000 mL, intravenous, Once, Last Rate: 4,000 mL/hr at 05/29/23 1540, 1,000 mL at 05/29/23 1540 **FOLLOWED BY** lactated ringer's infusion, 125 mL/hr, intravenous, Continuous, Manuel Albert MD, Last Rate: 125 mL/hr at 05/29/23 1605, 125 mL/hr at 05/29/23 1605    lidocaine PF (XYLOCAINE) 10 mg/mL (1 %) injection 0-30 mL, 0-30 mL, subcutaneous, Once PRN, Manuel Albert MD    methylergonovine (METHERGINE) injection 200 mcg, 200 mcg, intramuscular, Once PRN, Manuel Albert MD    miSOPROStoL (CYTOTEC) tablet 1,000 mcg, 1,000 mcg, rectal, Once PRN, Manuel Albert MD    oxytocin (PITOCIN) injection 10 Units, 10 Units, intramuscular, Once PRN, Manuel Albert MD    oxytocin in NSS (PITOCIN) 40 unit/1000 mL infusion 20 Units, 500 mL, intravenous, Once  **FOLLOWED BY** oxytocin in NSS (PITOCIN) 40 unit/1000 mL infusion, , intravenous, Continuous, Manuel Albert MD    tranexamic acid (CYKLOKAPRON) 1000 mg/100 mL sodium chloride 0.7% (premix), 1,000 mg, intravenous, Once PRN, Manuel Albert MD    Assessment/Plan     Marcela Canseco is a 32 y.o. female  at 40w0d admitted with labor management     1) FHR: Category I  2) GBS:  negative  3) augment, antciipate     Manuel Albert MD

## 2023-05-29 NOTE — L&D DELIVERY NOTE
OB Vaginal Delivery Note    Delivery:2023 at 5:07 PM   Patient:Marcela Canseco  :1990    Review the Delivery Report for details.     Delivery Details    Pre-Op Diagnosis: 1. 32 y.o.  intrauterine pregnancy at 40w0d with araujo gestation.  2. Labor   Post-Op Diagnosis: 1. same   Delivery Clinician: Manuel Albert    Delivery Assist;Delivery Nurse  ;Macey Tomas    Delivery Type: , Spontaneous    Labor Complications:  terminal bradycardia   EBL: 250  mL   Anesthesia Type: Epidural    Placenta Delivery  expressed   Placenta Disposition:   discarded   Additional Specimens Cord Gas      INFANT INFORMATION  Time of Birth:5:07 PM   Presentation: Vertex   Position:Left ,Occiput ,Transverse   Cord:3VC  ,Complications: terminal bradycardia   Sex: female   [place] Weight: 3.76 kg (8 lb 4.6 oz)      1 Minute 5 Minute 10 Minute   Apgar Totals: 8   9           Cord Gas 7.24   7.22    Delivery Details:    Marcela Canseco is a 32 y.o.  at 40w0d gestation who presented to the hospital for 40 weeks gestation of pregnancy [Z3A.40].  Her labor was augmented AROM.  The patient progressed to fully dilated and pushed for 20 minutes.  A viable  female  infant was delivered by , Spontaneous  from Left ,Occiput ,Transverse .  The anterior and posterior shoulders delivered without difficulty followed by the remainder of the infant. A spontaneous cry was heard, and the infant appeared to be moving all 4 extremities and placed on maternal chest with NICU in attendance. Due to terminal bradycardia, terminal meconium noted as well. . Delayed cord  Clamping  and cut with 3VC noted.  The placenta delivered spontaneously shortly thereafter.  Fundal massage was performed and the fundus was found to be firm, and lochia was within normal limits. The perineum, vagina, cervix were inspected, and the following lacerations were noted:      Episiotomy: None    Lacerations:   Perineal: 2nd  Repaired: Yes      Periurethral:    Repaired:     Labial:   Repaired:     Sulcus:   Repaired:     Vaginal: Yes  Repaired:  yes   Cervical:    Repaired:        Any lacerations were repaired in the usual fashion using 2-0 Synthetic Suture  suture. Excellent hemostasis was noted. At the completion of the case, sponge and needle counts were correct. The infant and patient were left in the delivery room in stable condition.     Attending Attestation: I was present and scrubbed for the entire procedure.    Manuel Albert MD

## 2023-05-29 NOTE — ANESTHESIA PREPROCEDURE EVALUATION
Anesthesia ROS/MED HX    Anesthesia History - neg      Relevant Problems   No relevant active problems       Physical Exam    Airway   Mallampati: I   TM distance: <3 FB   Neck ROM: full  Cardiovascular - normal   Rhythm: regular   Rate: normalPulmonary - normal   clear to auscultation  Other Findings   Back - neg   landmarks identified          Anesthesia Plan    Plan: labor epidural   2 ASA  Anesthetic plan and risks discussed with: patient

## 2023-05-29 NOTE — ANESTHESIOLOGIST PRE-PROCEDURE ATTESTATION
Pre-Procedure Patient Identification:  I am the Primary Anesthesiologist and have identified the patient on 05/29/23 at 3:59 PM.   I have confirmed the procedure(s) will be performed by the following surgeon/proceduralist * Surgery not found *.

## 2023-05-29 NOTE — ANESTHESIA POSTPROCEDURE EVALUATION
Patient: Marcela Canseco    Procedure Summary     Date: 05/29/23 Room / Location:     Anesthesia Start: 1610 Anesthesia Stop: 1707    Procedure: Labor Analgesia Diagnosis:     Scheduled Providers:  Responsible Provider: Jesus Alberto Rosas MD    Anesthesia Type: labor epidural ASA Status: 2          Anesthesia Type: labor epidural  PACU Vitals    No data found in the last 10 encounters.           Anesthesia Post Evaluation    Pain score: 4  Pain management: satisfactory to patient  Mode of pain management: IV medication  Patient location during evaluation: PACU  Patient participation: complete - patient participated  Level of consciousness: awake  Cardiovascular status: acceptable  Airway Patency: adequate  Respiratory status: acceptable  Hydration status: stable  Anesthetic complications: no

## 2023-05-29 NOTE — H&P
HPI     Marcela Canseco is a 32 y.o. female  at 40w0d with an estimated due date of 2023, by Patient Reported who presents with progressively more painful contractions, Denies LOF/BleedingHA/N/V/RUQ pain/UE edema. Reports PH with last pregnancy, took Baby ASA. Requestig epidural , Last sono  EFW [id number] , sono last week demonstrates cephalic presentation    Last PO intake:   ,     ,      OB History:   OB History    Para Term  AB Living   2 1 1 0 0 1   SAB IAB Ectopic Multiple Live Births   0 0 0 0 1      # Outcome Date GA Lbr Kurtis/2nd Weight Sex Delivery Anes PTL Lv   2 Current            1 Term 21 40w4d 00:27 / 00:16 3.7 kg (8 lb 2.5 oz) F Vag-Spont None N SILVER      Complications: Precipitous Labor      Name: ALEK,GIRL      Apgar1: 9  Apgar5: 9       Medical History: History reviewed. No pertinent past medical history.    Surgical History:   Past Surgical History:   Procedure Laterality Date   • CHOLECYSTECTOMY     • PILONIDAL CYST / SINUS EXCISION N/A    • TONSILLECTOMY     • WISDOM TOOTH EXTRACTION         Social History:   Social History     Socioeconomic History   • Marital status:      Spouse name: Ruslan   • Number of children: 1   • Years of education: None   • Highest education level: None   Occupational History   • Occupation: residental sales   Tobacco Use   • Smoking status: Never   • Smokeless tobacco: Never   Vaping Use   • Vaping status: Never Used   Substance and Sexual Activity   • Alcohol use: Not Currently   • Drug use: Not Currently     Types: Marijuana     Comment: for back pain/ smoked two months before getting pregnant   • Sexual activity: Yes     Partners: Male        Family History:   Family History   Problem Relation Age of Onset   • No Known Problems Biological Mother    • No Known Problems Biological Father    • Psoriasis Biological Brother    • No Known Problems Biological Brother    • No Known Problems Biological Brother    • No Known Problems  Biological Brother    • Cancer Maternal Grandfather        Allergies: Patient has no known allergies.    Prior to Admission medications    Medication Sig Start Date End Date Taking? Authorizing Provider   aspirin 81 mg chewable tablet Take 81 mg by mouth daily.   Yes ProviderRick MD   calcium carbonate (TUMS) 200 mg calcium (500 mg) chewable tablet Take 2 tablets by mouth as needed for indigestion or heartburn.   Yes ProviderRick MD   cetirizine (ZyrTEC) 10 mg tablet Take 10 mg by mouth daily.   Yes Juan Alberto Cartagena MD   prenatal vit no.130-iron-folic 27 mg iron- 800 mcg tablet tablet Take 1 tablet by mouth daily.   Yes ProviderRick MD   ibuprofen (MOTRIN) 600 mg tablet Take 1 tablet (600 mg total) by mouth every 6 (six) hours as needed (pain) for up to 10 days.  Patient not taking: Reported on 5/29/2023 9/25/21 10/5/21  Bal Rodgers,    etonogestreL-ethinyl estradioL (NUVARING) 0.12-0.015 mg/24 hr vaginal ring insert 1 vaginal ring by vaginal route  every month leave in place for 3 weeks, remove for 1 week 1/26/17 5/29/23  ProviderJuan Alberto MD   gabapentin (NEURONTIN) 300 mg capsule 1 capsule at bedtime for 3 nights, then increase to 1 capsule 2 times daily for 3 days, then increase to 1 capsule 3 times daily 6/10/22 5/29/23  Della Gallagher DO   meloxicam (MOBIC) 15 mg tablet TAKE 1 TABLET (15 MG TOTAL) BY MOUTH DAILY. 8/2/22 5/29/23  Carmen Clay CRNP   tiZANidine (ZANAFLEX) 2 mg tablet TAKE 1 TABLET BY MOUTH EVERY 8 HOURS AS NEEDED FOR MUSCLE SPASMS. 8/2/22 5/29/23  Carmen Clay CRNP       Review of Systems  Pertinent items are noted in HPI.    Objective     Vital Signs for the last 24 hours:  Heart Rate:  [89] 89  Resp:  [18] 18    Latest cervical exam:  Cervical Dilation (cm): 4-5  Cervical Effacement: 80  Fetal Station: -2  Method: sterile exam per RN (05/29/23 1731)        Fetal Monitoring:  FHR Baseline: 120  FHR Variability:  moderate  FHR Accelerations: absent  FHR Decelerations: absent    Contraction Frequency: q 3 to 4    Exam:  General Appearance: Alert, cooperative, no acute distress  Lungs: Clear to auscultation bilaterally, respirations unlabored  Heart: Regular rate and rhythm, S1 and S2 normal, no murmur, rub or gallop  Abdomen: gravid, nontender  Genitalia: See vaginal exam  Extremities: no edema or calf tenderness  Neurologic: grossly intact without focal deficits    Ultrasounds:   I have reviewed the applicable Ultrasounds.        Labs:  ABO   Date Value Ref Range Status   2021 A  Final     Rh Factor   Date Value Ref Range Status   2021 Positive  Final     Rubella IgG Scr   Date Value Ref Range Status   2021 IMMUNE  Final     Strep Gp B Cult/DNA Probe   Date Value Ref Range Status   2021 No growth See table below, No growth at 18-24 hours, Probable contaminants, suggest recollection, No growth at 48 hours, No growth at 72 hours, No growth at 96 hours, No growth at 120 hours, No growth at 1 week, No growth at 2 weeks, No growth at 3 weeks, No gr... Final       Assessment/Plan     Marcela Canseco is a 32 y.o. female  at 40w0d admitted for labor management , IVF, labs, epidural prn, AROM prn, anticipate     FHR: Category I  GBS: negative    Manuel Albert MD

## 2023-05-30 LAB
HBV SURFACE AG SER QL: NONREACTIVE
T PALLIDUM AB SER QL IF: NONREACTIVE

## 2023-05-30 PROCEDURE — 12000000 HC ROOM AND CARE MED/SURG

## 2023-05-30 PROCEDURE — 80307 DRUG TEST PRSMV CHEM ANLYZR: CPT | Performed by: OBSTETRICS & GYNECOLOGY

## 2023-05-30 PROCEDURE — 63700000 HC SELF-ADMINISTRABLE DRUG: Performed by: OBSTETRICS & GYNECOLOGY

## 2023-05-30 RX ADMIN — SENNOSIDES AND DOCUSATE SODIUM 1 TABLET: 50; 8.6 TABLET ORAL at 08:15

## 2023-05-30 RX ADMIN — ACETAMINOPHEN 650 MG: 325 TABLET ORAL at 04:45

## 2023-05-30 RX ADMIN — OXYCODONE HYDROCHLORIDE 5 MG: 5 TABLET ORAL at 04:46

## 2023-05-30 RX ADMIN — IBUPROFEN 600 MG: 600 TABLET, FILM COATED ORAL at 19:53

## 2023-05-30 RX ADMIN — IBUPROFEN 600 MG: 600 TABLET, FILM COATED ORAL at 08:15

## 2023-05-30 RX ADMIN — ACETAMINOPHEN 650 MG: 325 TABLET ORAL at 16:00

## 2023-05-30 RX ADMIN — ACETAMINOPHEN 650 MG: 325 TABLET ORAL at 11:30

## 2023-05-30 RX ADMIN — ACETAMINOPHEN 650 MG: 325 TABLET ORAL at 22:37

## 2023-05-30 RX ADMIN — SENNOSIDES AND DOCUSATE SODIUM 1 TABLET: 50; 8.6 TABLET ORAL at 19:53

## 2023-05-30 RX ADMIN — IBUPROFEN 600 MG: 600 TABLET, FILM COATED ORAL at 14:15

## 2023-05-30 RX ADMIN — PRENATAL VIT W/ FE FUMARATE-FA TAB 27-0.8 MG 1 TABLET: 27-0.8 TAB at 08:15

## 2023-05-30 NOTE — PROGRESS NOTES
Obstetrics Postpartum Progress Note    Events  No acute events overnight.    Subjective  Pain: no  Bleeding: lochia minimal  Diet: taking regular diet  Voiding: without difficulty  Ambulating: as tolerated    Vitals  Temp:  [36.3 °C (97.4 °F)-36.8 °C (98.3 °F)] 36.3 °C (97.4 °F)  Heart Rate:  [] 78  Resp:  [16-18] 16  BP: (107-149)/(60-93) 107/60    I&O    Intake/Output Summary (Last 24 hours) at 2023 1356  Last data filed at 2023 1900  Gross per 24 hour   Intake --   Output 440 ml   Net -440 ml       Physical Exam  General: well  Heart:regular  Lungs: without issue  Abdomen: soft, nondistended, non-tender  Fundus: firm  Perineum: deferred  Extremities: symmetric    Labs  Labs Reviewed:  Lab Results   Component Value Date    ABO A 2023    LABRH Positive 2023        Assessment/Plan   Problem-based Assessment and Plan    Marcela Canseco is a 32 y.o.  postpartum day 1 s/p Vaginal, Spontaneous .doing well s/p TOLAC-routine PPC with dc in am-rev instruction, meds and follow up    1. Vital Signs: stable  2. Hemodynamics: stable  3. Pain: controlled  4. VTE Assessment: Early Ambulation  5. Vaccinations/Rhogam: rhogam not indicated   6. Postpartum care: meeting all goals     Sofy Butler DO

## 2023-05-30 NOTE — DISCHARGE SUMMARY
Inpatient Discharge Summary    BRIEF OVERVIEW  Admitting Provider: Sofy Butler DO  Discharge Provider: Bal Rodgers DO  Primary Care Physician at Discharge: Della Gallagher [phone]     Admission Date: 5/29/2023     Discharge Date: 5/31/2023  Primary Discharge Diagnosis  40 weeks gestation of pregnancy    Secondary Discharge Diagnosis  TOLAC viable female    Discharge Disposition  Home     Discharge Medications     Medication List      CONTINUE taking these medications    calcium carbonate 200 mg calcium (500 mg) chewable tablet  Commonly known as: TUMS  Take 2 tablets by mouth as needed for indigestion or heartburn.  Dose: 2 tablet     cetirizine 10 mg tablet  Commonly known as: ZyrTEC  Take 10 mg by mouth daily.  Dose: 10 mg     prenatal vit no.130-iron-folic 27 mg iron- 800 mcg tablet tablet  Take 1 tablet by mouth daily.  Dose: 1 tablet        STOP taking these medications    aspirin 81 mg chewable tablet        ASK your doctor about these medications    ibuprofen 600 mg tablet  Commonly known as: MOTRIN  Take 1 tablet (600 mg total) by mouth every 6 (six) hours as needed (pain) for up to 10 days.  Dose: 600 mg                Test Results Pending at Discharge  Unresulted Labs (From admission, onward)     Start     Ordered    05/30/23 0600  CBC (Complete Blood Count) Postpartum Day #1 in the AM  Morning draw        Question:  Release to patient  Answer:  Immediate    05/29/23 1954 05/29/23 2004  Ethanol, urine  (Inpatient OB Drug Screen Panel)  Once        Question:  Release to patient  Answer:  Immediate    05/29/23 2004 05/29/23 2004  Drug screen panel, emergency  (Inpatient OB Drug Screen Panel)  STAT        Question:  Release to patient  Answer:  Immediate    05/29/23 2004 05/29/23 2004  Supplemental drug screen, urine  (Inpatient OB Drug Screen Panel)  Once        Question:  Release to patient  Answer:  Immediate    05/29/23 2004                DETAILS OF HOSPITAL  STAY    Presenting Problem/History of Present Illness  40 weeks gestation of pregnancy [Z3A.40]      Hospital Course/Operative Procedures Performed

## 2023-05-31 VITALS
OXYGEN SATURATION: 97 % | RESPIRATION RATE: 18 BRPM | SYSTOLIC BLOOD PRESSURE: 115 MMHG | BODY MASS INDEX: 37.33 KG/M2 | WEIGHT: 252 LBS | TEMPERATURE: 97.7 F | HEART RATE: 82 BPM | DIASTOLIC BLOOD PRESSURE: 69 MMHG | HEIGHT: 69 IN

## 2023-05-31 LAB
AMPHET UR QL SCN: NOT DETECTED
BARBITURATES UR QL SCN: NOT DETECTED
BENZODIAZ UR QL SCN: NOT DETECTED
BUPRENORPHINE UR QL: NOT DETECTED
CANNABINOIDS UR QL SCN: NOT DETECTED
COCAINE UR QL SCN: NOT DETECTED
METHADONE UR QL SCN: NOT DETECTED
OPIATES UR QL SCN: NOT DETECTED
OXYCODONE UR QL SCN: POSITIVE
PCP UR QL SCN: NOT DETECTED

## 2023-05-31 PROCEDURE — 63700000 HC SELF-ADMINISTRABLE DRUG: Performed by: OBSTETRICS & GYNECOLOGY

## 2023-05-31 RX ADMIN — IBUPROFEN 600 MG: 600 TABLET, FILM COATED ORAL at 04:30

## 2023-05-31 RX ADMIN — ACETAMINOPHEN 650 MG: 325 TABLET ORAL at 08:15

## 2023-05-31 RX ADMIN — ACETAMINOPHEN 650 MG: 325 TABLET ORAL at 15:15

## 2023-05-31 RX ADMIN — PRENATAL VIT W/ FE FUMARATE-FA TAB 27-0.8 MG 1 TABLET: 27-0.8 TAB at 08:15

## 2023-05-31 RX ADMIN — SENNOSIDES AND DOCUSATE SODIUM 1 TABLET: 50; 8.6 TABLET ORAL at 08:15

## 2023-05-31 RX ADMIN — IBUPROFEN 600 MG: 600 TABLET, FILM COATED ORAL at 10:15

## 2023-05-31 NOTE — LACTATION NOTE
Lactation visit. Pt reports baby has been latching and feeding generally well. Mom independently latched baby with active suck/swallow rhythm. Reviewed normal  behavior, importance of frequent, on demand/cue-based feeds at least 8-12x/day, and ways to tell baby is getting adequate volumes at the breast. Pt denies questions/concerns at this time. Made aware of LC availability and encouraged to call for assistance as needed.

## 2023-05-31 NOTE — PLAN OF CARE
SW consulted due to mother`s hx of marijuana.     SW introduced self to parents and explained SW`s role.  SW obtained consent to speak with Mother and Father at the time of consult. SW verified all demographic information and insurance.    Mother and Father live together in [place].  [place]. The couple has close family support from Dad`s side. [place] to family if needed.     SW provided education about how to add  to Father`s insurance. Father will add baby to his insurance. Mother and Father report no concerns with basic supplies for .      SW provided education on PP Depression/Anxiety,   resources, and SW contact information. Mother expressed an understanding on PP Depression/Anxiety symptoms. Mother is open to utilizing resources if needed. No current Therapist at this time. Mother has an active medical marijuana card that SW observed first hand. MJ card is active from 3/29/23 to 3/29/24. Mother used medical marijuana in the past for the purpose of Anxiety. Mother reports she also antionette with anxiety with running and exercise.   Mother reports last year after she ran the [employer] she was unable to move for 3 days. Patient was unable to put shoes on her feet for about 1 1/2 months by herself. Pt`s spouse helped pt with all ADLs/helped with mobility as much as possible. Pt started attending PT 1x and week and a chripractor 1x a week. Pt has chronic issues sciatica. Mother denies any pain medication or marijuana use when she was pregnant.     Baby`s 2nd meconium test pending at this time.     SW provided emotional support to Mother and Father. SW asked parents to call SW with any issues, concerns or for support.  No child line report needed at this time PENDING meconium results.   p. [phone]

## 2023-05-31 NOTE — NURSING NOTE
Educated pt in regards to paperwork needed to be filled out prior to DC.  Birth certificate collected, [place] and SIDS still needed.

## 2023-05-31 NOTE — LACTATION NOTE
PT due for discharge today, denies questions/concerns at this time. Reviewed breastfeeding education.PT has a breast pump for home, went over outpatient lactation resources after d/c.

## 2023-05-31 NOTE — PLAN OF CARE
Problem: Adult Inpatient Plan of Care  Goal: Plan of Care Review  Outcome: Met  Goal: Patient-Specific Goal (Individualized)  Outcome: Met  Goal: Absence of Hospital-Acquired Illness or Injury  Outcome: Met  Goal: Optimal Comfort and Wellbeing  Outcome: Met  Goal: Readiness for Transition of Care  Outcome: Met

## 2023-05-31 NOTE — NURSING NOTE
Discharge education and teaching completed.  Patient verbalized understanding of follow up appointments, medications, and reasons to call the doctor. Patient discharged in stable condition with family.

## 2025-06-19 ENCOUNTER — APPOINTMENT (OUTPATIENT)
Dept: URBAN - METROPOLITAN AREA CLINIC 23 | Facility: CLINIC | Age: 35
Setting detail: DERMATOLOGY
End: 2025-06-19

## 2025-06-19 DIAGNOSIS — D22 MELANOCYTIC NEVI: ICD-10-CM

## 2025-06-19 DIAGNOSIS — L82.1 OTHER SEBORRHEIC KERATOSIS: ICD-10-CM

## 2025-06-19 DIAGNOSIS — L71.8 OTHER ROSACEA: ICD-10-CM | Status: INADEQUATELY CONTROLLED

## 2025-06-19 DIAGNOSIS — L81.4 OTHER MELANIN HYPERPIGMENTATION: ICD-10-CM

## 2025-06-19 DIAGNOSIS — D18.0 HEMANGIOMA: ICD-10-CM

## 2025-06-19 DIAGNOSIS — L70.0 ACNE VULGARIS: ICD-10-CM

## 2025-06-19 PROBLEM — D22.5 MELANOCYTIC NEVI OF TRUNK: Status: ACTIVE | Noted: 2025-06-19

## 2025-06-19 PROBLEM — D18.01 HEMANGIOMA OF SKIN AND SUBCUTANEOUS TISSUE: Status: ACTIVE | Noted: 2025-06-19

## 2025-06-19 PROCEDURE — ? SUNSCREEN RECOMMENDATIONS

## 2025-06-19 PROCEDURE — ? COUNSELING

## 2025-06-19 PROCEDURE — ? PRESCRIPTION MEDICATION MANAGEMENT

## 2025-06-19 PROCEDURE — ? PRESCRIPTION

## 2025-06-19 PROCEDURE — ? FULL BODY SKIN EXAM

## 2025-06-19 RX ORDER — TRETIONIN 0.25 MG/G
CREAM TOPICAL
Qty: 45 | Refills: 7 | Status: ERX | COMMUNITY
Start: 2025-06-19

## 2025-06-19 RX ORDER — IVERMECTIN/METRONIDAZOLE/NIACI 1 %-1 %-4%
GEL (GRAM) TOPICAL
Qty: 30 | Refills: 7 | Status: ERX | COMMUNITY
Start: 2025-06-19

## 2025-06-19 RX ADMIN — Medication: at 00:00

## 2025-06-19 RX ADMIN — TRETIONIN: 0.25 CREAM TOPICAL at 00:00

## 2025-06-19 ASSESSMENT — LOCATION ZONE DERM
LOCATION ZONE: FACE
LOCATION ZONE: TRUNK

## 2025-06-19 ASSESSMENT — LOCATION DETAILED DESCRIPTION DERM
LOCATION DETAILED: RIGHT INFERIOR CENTRAL MALAR CHEEK
LOCATION DETAILED: LEFT INFERIOR CENTRAL MALAR CHEEK
LOCATION DETAILED: RIGHT CENTRAL MALAR CHEEK
LOCATION DETAILED: LEFT CENTRAL MALAR CHEEK
LOCATION DETAILED: SUPERIOR THORACIC SPINE

## 2025-06-19 ASSESSMENT — LOCATION SIMPLE DESCRIPTION DERM
LOCATION SIMPLE: LEFT CHEEK
LOCATION SIMPLE: UPPER BACK
LOCATION SIMPLE: RIGHT CHEEK

## 2025-06-19 NOTE — PROCEDURE: FULL BODY SKIN EXAM
Price (Do Not Change): 0.00
Instructions: This plan will send the code FBSE to the PM system.  DO NOT or CHANGE the price.
Detail Level: Simple
Female

## 2025-06-19 NOTE — PROCEDURE: COUNSELING
Detail Level: Generalized
Detail Level: Detailed
Use Enhanced Medication Counseling?: No
Dapsone Pregnancy And Lactation Text: This medication is Pregnancy Category C and is not considered safe during pregnancy or breast feeding.
High Dose Vitamin A Counseling: Side effects reviewed, pt to contact office should one occur.
Tetracycline Pregnancy And Lactation Text: This medication is Pregnancy Category D and not consider safe during pregnancy. It is also excreted in breast milk.
Azithromycin Counseling:  I discussed with the patient the risks of azithromycin including but not limited to GI upset, allergic reaction, drug rash, diarrhea, and yeast infections.
Minocycline Counseling: Patient advised regarding possible photosensitivity and discoloration of the teeth, skin, lips, tongue and gums.  Patient instructed to avoid sunlight, if possible.  When exposed to sunlight, patients should wear protective clothing, sunglasses, and sunscreen.  The patient was instructed to call the office immediately if the following severe adverse effects occur:  hearing changes, easy bruising/bleeding, severe headache, or vision changes.  The patient verbalized understanding of the proper use and possible adverse effects of minocycline.  All of the patient's questions and concerns were addressed.
Topical Sulfur Applications Pregnancy And Lactation Text: This medication is Pregnancy Category C and has an unknown safety profile during pregnancy. It is unknown if this topical medication is excreted in breast milk.
Topical Retinoid counseling:  Patient advised to apply a pea-sized amount only at bedtime and wait 30 minutes after washing their face before applying.  If too drying, patient may add a non-comedogenic moisturizer. The patient verbalized understanding of the proper use and possible adverse effects of retinoids.  All of the patient's questions and concerns were addressed.
Spironolactone Pregnancy And Lactation Text: This medication can cause feminization of the male fetus and should be avoided during pregnancy. The active metabolite is also found in breast milk.
Benzoyl Peroxide Counseling: Patient counseled that medicine may cause skin irritation and bleach clothing.  In the event of skin irritation, the patient was advised to reduce the amount of the drug applied or use it less frequently.   The patient verbalized understanding of the proper use and possible adverse effects of benzoyl peroxide.  All of the patient's questions and concerns were addressed.
Bactrim Pregnancy And Lactation Text: This medication is Pregnancy Category D and is known to cause fetal risk.  It is also excreted in breast milk.
Topical Clindamycin Pregnancy And Lactation Text: This medication is Pregnancy Category B and is considered safe during pregnancy. It is unknown if it is excreted in breast milk.
Birth Control Pills Pregnancy And Lactation Text: This medication should be avoided if pregnant and for the first 30 days post-partum.
Isotretinoin Counseling: Patient should get monthly blood tests, not donate blood, not drive at night if vision affected, not share medication, and not undergo elective surgery for 6 months after tx completed. Side effects reviewed, pt to contact office should one occur.
Azelaic Acid Counseling: Patient counseled that medicine may cause skin irritation and to avoid applying near the eyes.  In the event of skin irritation, the patient was advised to reduce the amount of the drug applied or use it less frequently.   The patient verbalized understanding of the proper use and possible adverse effects of azelaic acid.  All of the patient's questions and concerns were addressed.
Tazorac Pregnancy And Lactation Text: This medication is not safe during pregnancy. It is unknown if this medication is excreted in breast milk.
Erythromycin Counseling:  I discussed with the patient the risks of erythromycin including but not limited to GI upset, allergic reaction, drug rash, diarrhea, increase in liver enzymes, and yeast infections.
Aklief counseling:  Patient advised to apply a pea-sized amount only at bedtime and wait 30 minutes after washing their face before applying.  If too drying, patient may add a non-comedogenic moisturizer.  The most commonly reported side effects including irritation, redness, scaling, dryness, stinging, burning, itching, and increased risk of sunburn.  The patient verbalized understanding of the proper use and possible adverse effects of retinoids.  All of the patient's questions and concerns were addressed.
Include Pregnancy/Lactation Warning?: Add Automatically Based on Childbearing Potential and Patient Age
Topical Retinoid Pregnancy And Lactation Text: This medication is Pregnancy Category C. It is unknown if this medication is excreted in breast milk.
High Dose Vitamin A Pregnancy And Lactation Text: High dose vitamin A therapy is contraindicated during pregnancy and breast feeding.
Winlevi Counseling:  I discussed with the patient the risks of topical clascoterone including but not limited to erythema, scaling, itching, and stinging. Patient voiced their understanding.
Doxycycline Counseling:  Patient counseled regarding possible photosensitivity and increased risk for sunburn.  Patient instructed to avoid sunlight, if possible.  When exposed to sunlight, patients should wear protective clothing, sunglasses, and sunscreen.  The patient was instructed to call the office immediately if the following severe adverse effects occur:  hearing changes, easy bruising/bleeding, severe headache, or vision changes.  The patient verbalized understanding of the proper use and possible adverse effects of doxycycline.  All of the patient's questions and concerns were addressed.
Azithromycin Pregnancy And Lactation Text: This medication is considered safe during pregnancy and is also secreted in breast milk.
Tetracycline Counseling: Patient counseled regarding possible photosensitivity and increased risk for sunburn.  Patient instructed to avoid sunlight, if possible.  When exposed to sunlight, patients should wear protective clothing, sunglasses, and sunscreen.  The patient was instructed to call the office immediately if the following severe adverse effects occur:  hearing changes, easy bruising/bleeding, severe headache, or vision changes.  The patient verbalized understanding of the proper use and possible adverse effects of tetracycline.  All of the patient's questions and concerns were addressed. Patient understands to avoid pregnancy while on therapy due to potential birth defects.
Benzoyl Peroxide Pregnancy And Lactation Text: This medication is Pregnancy Category C. It is unknown if benzoyl peroxide is excreted in breast milk.
Dapsone Counseling: I discussed with the patient the risks of dapsone including but not limited to hemolytic anemia, agranulocytosis, rashes, methemoglobinemia, kidney failure, peripheral neuropathy, headaches, GI upset, and liver toxicity.  Patients who start dapsone require monitoring including baseline LFTs and weekly CBCs for the first month, then every month thereafter.  The patient verbalized understanding of the proper use and possible adverse effects of dapsone.  All of the patient's questions and concerns were addressed.
Isotretinoin Pregnancy And Lactation Text: This medication is Pregnancy Category X and is considered extremely dangerous during pregnancy. It is unknown if it is excreted in breast milk.
Topical Sulfur Applications Counseling: Topical Sulfur Counseling: Patient counseled that this medication may cause skin irritation or allergic reactions.  In the event of skin irritation, the patient was advised to reduce the amount of the drug applied or use it less frequently.   The patient verbalized understanding of the proper use and possible adverse effects of topical sulfur application.  All of the patient's questions and concerns were addressed.
Spironolactone Counseling: Patient advised regarding risks of diarrhea, abdominal pain, hyperkalemia, birth defects (for female patients), liver toxicity and renal toxicity. The patient may need blood work to monitor liver and kidney function and potassium levels while on therapy. The patient verbalized understanding of the proper use and possible adverse effects of spironolactone.  All of the patient's questions and concerns were addressed.
Erythromycin Pregnancy And Lactation Text: This medication is Pregnancy Category B and is considered safe during pregnancy. It is also excreted in breast milk.
Sarecycline Counseling: Patient advised regarding possible photosensitivity and discoloration of the teeth, skin, lips, tongue and gums.  Patient instructed to avoid sunlight, if possible.  When exposed to sunlight, patients should wear protective clothing, sunglasses, and sunscreen.  The patient was instructed to call the office immediately if the following severe adverse effects occur:  hearing changes, easy bruising/bleeding, severe headache, or vision changes.  The patient verbalized understanding of the proper use and possible adverse effects of sarecycline.  All of the patient's questions and concerns were addressed.
Azelaic Acid Pregnancy And Lactation Text: This medication is considered safe during pregnancy and breast feeding.
Topical Clindamycin Counseling: Patient counseled that this medication may cause skin irritation or allergic reactions.  In the event of skin irritation, the patient was advised to reduce the amount of the drug applied or use it less frequently.   The patient verbalized understanding of the proper use and possible adverse effects of clindamycin.  All of the patient's questions and concerns were addressed.
Birth Control Pills Counseling: Birth Control Pill Counseling: I discussed with the patient the potential side effects of OCPs including but not limited to increased risk of stroke, heart attack, thrombophlebitis, deep venous thrombosis, hepatic adenomas, breast changes, GI upset, headaches, and depression.  The patient verbalized understanding of the proper use and possible adverse effects of OCPs. All of the patient's questions and concerns were addressed.
Doxycycline Pregnancy And Lactation Text: This medication is Pregnancy Category D and not consider safe during pregnancy. It is also excreted in breast milk but is considered safe for shorter treatment courses.
Bactrim Counseling:  I discussed with the patient the risks of sulfa antibiotics including but not limited to GI upset, allergic reaction, drug rash, diarrhea, dizziness, photosensitivity, and yeast infections.  Rarely, more serious reactions can occur including but not limited to aplastic anemia, agranulocytosis, methemoglobinemia, blood dyscrasias, liver or kidney failure, lung infiltrates or desquamative/blistering drug rashes.
Aklief Pregnancy And Lactation Text: It is unknown if this medication is safe to use during pregnancy.  It is unknown if this medication is excreted in breast milk.  Breastfeeding women should use the topical cream on the smallest area of the skin for the shortest time needed while breastfeeding.  Do not apply to nipple and areola.
Tazorac Counseling:  Patient advised that medication is irritating and drying.  Patient may need to apply sparingly and wash off after an hour before eventually leaving it on overnight.  The patient verbalized understanding of the proper use and possible adverse effects of tazorac.  All of the patient's questions and concerns were addressed.
Winlevi Pregnancy And Lactation Text: This medication is considered safe during pregnancy and breastfeeding.

## 2025-06-19 NOTE — PROCEDURE: PRESCRIPTION MEDICATION MANAGEMENT
Detail Level: Zone
Initiate Treatment: Rositara 1 %-1 %-4 % topical gel: Apply thin layer every day to face.
Render In Strict Bullet Format?: No
Initiate Treatment: tretinoin 0.025 % topical cream: Apply a pea sized amount to face at bedtime. Start 3 nights per week then gradually increase use to nightly as tolerated. Use facial moisturizer on top if skin feels dry.
Initiate Treatment: Tretinoin 0.025% cream qHS